# Patient Record
Sex: FEMALE | Race: WHITE | Employment: OTHER | URBAN - METROPOLITAN AREA
[De-identification: names, ages, dates, MRNs, and addresses within clinical notes are randomized per-mention and may not be internally consistent; named-entity substitution may affect disease eponyms.]

---

## 2020-06-25 ENCOUNTER — MEDICAL CORRESPONDENCE (OUTPATIENT)
Dept: HEALTH INFORMATION MANAGEMENT | Facility: CLINIC | Age: 65
End: 2020-06-25

## 2020-09-28 ENCOUNTER — MEDICAL CORRESPONDENCE (OUTPATIENT)
Dept: HEALTH INFORMATION MANAGEMENT | Facility: CLINIC | Age: 65
End: 2020-09-28

## 2020-11-04 ENCOUNTER — HOSPITAL ENCOUNTER (INPATIENT)
Facility: CLINIC | Age: 65
LOS: 6 days | Discharge: NURSING, REHAB OR LONG-TERM CARE | DRG: 871 | End: 2020-11-10
Attending: EMERGENCY MEDICINE | Admitting: INTERNAL MEDICINE
Payer: MEDICARE

## 2020-11-04 ENCOUNTER — APPOINTMENT (OUTPATIENT)
Dept: GENERAL RADIOLOGY | Facility: CLINIC | Age: 65
DRG: 871 | End: 2020-11-04
Attending: EMERGENCY MEDICINE
Payer: MEDICARE

## 2020-11-04 DIAGNOSIS — A41.9 SEPSIS, DUE TO UNSPECIFIED ORGANISM, UNSPECIFIED WHETHER ACUTE ORGAN DYSFUNCTION PRESENT (H): ICD-10-CM

## 2020-11-04 DIAGNOSIS — J18.9 PNEUMONIA OF BOTH LUNGS DUE TO INFECTIOUS ORGANISM, UNSPECIFIED PART OF LUNG: ICD-10-CM

## 2020-11-04 DIAGNOSIS — Z86.73 HISTORY OF STROKE: Primary | ICD-10-CM

## 2020-11-04 DIAGNOSIS — Z20.822 PERSON UNDER INVESTIGATION FOR COVID-19: ICD-10-CM

## 2020-11-04 DIAGNOSIS — R09.02 HYPOXIA: ICD-10-CM

## 2020-11-04 PROBLEM — I48.91 ATRIAL FIBRILLATION (H): Status: ACTIVE | Noted: 2020-11-04

## 2020-11-04 PROBLEM — J44.9 CHRONIC OBSTRUCTIVE PULMONARY DISEASE (H): Status: ACTIVE | Noted: 2020-11-04

## 2020-11-04 LAB
ABO + RH BLD: NORMAL
ABO + RH BLD: NORMAL
ALBUMIN SERPL-MCNC: 2.1 G/DL (ref 3.4–5)
ALBUMIN SERPL-MCNC: 2.4 G/DL (ref 3.4–5)
ALP SERPL-CCNC: 116 U/L (ref 40–150)
ALP SERPL-CCNC: 117 U/L (ref 40–150)
ALT SERPL W P-5'-P-CCNC: 35 U/L (ref 0–50)
ALT SERPL W P-5'-P-CCNC: 37 U/L (ref 0–50)
ANION GAP SERPL CALCULATED.3IONS-SCNC: 4 MMOL/L (ref 3–14)
ANION GAP SERPL CALCULATED.3IONS-SCNC: 5 MMOL/L (ref 3–14)
APTT PPP: 33 SEC (ref 22–37)
AST SERPL W P-5'-P-CCNC: 16 U/L (ref 0–45)
AST SERPL W P-5'-P-CCNC: 17 U/L (ref 0–45)
BASE EXCESS BLDV CALC-SCNC: 10.6 MMOL/L
BASOPHILS # BLD AUTO: 0 10E9/L (ref 0–0.2)
BASOPHILS # BLD AUTO: 0 10E9/L (ref 0–0.2)
BASOPHILS NFR BLD AUTO: 0.1 %
BASOPHILS NFR BLD AUTO: 0.2 %
BILIRUB SERPL-MCNC: 0.2 MG/DL (ref 0.2–1.3)
BILIRUB SERPL-MCNC: 0.3 MG/DL (ref 0.2–1.3)
BUN SERPL-MCNC: 17 MG/DL (ref 7–30)
BUN SERPL-MCNC: 21 MG/DL (ref 7–30)
CALCIUM SERPL-MCNC: 9.2 MG/DL (ref 8.5–10.1)
CALCIUM SERPL-MCNC: 9.3 MG/DL (ref 8.5–10.1)
CHLORIDE SERPL-SCNC: 100 MMOL/L (ref 94–109)
CHLORIDE SERPL-SCNC: 103 MMOL/L (ref 94–109)
CK SERPL-CCNC: 31 U/L (ref 30–225)
CO2 SERPL-SCNC: 31 MMOL/L (ref 20–32)
CO2 SERPL-SCNC: 33 MMOL/L (ref 20–32)
CREAT SERPL-MCNC: 0.37 MG/DL (ref 0.52–1.04)
CREAT SERPL-MCNC: 0.53 MG/DL (ref 0.52–1.04)
CRP SERPL-MCNC: 111 MG/L (ref 0–8)
CRP SERPL-MCNC: 87.3 MG/L (ref 0–8)
D DIMER PPP FEU-MCNC: 2.4 UG/ML FEU (ref 0–0.5)
DIFFERENTIAL METHOD BLD: ABNORMAL
DIFFERENTIAL METHOD BLD: ABNORMAL
EOSINOPHIL # BLD AUTO: 0 10E9/L (ref 0–0.7)
EOSINOPHIL # BLD AUTO: 0.4 10E9/L (ref 0–0.7)
EOSINOPHIL NFR BLD AUTO: 0.1 %
EOSINOPHIL NFR BLD AUTO: 2.5 %
ERYTHROCYTE [DISTWIDTH] IN BLOOD BY AUTOMATED COUNT: 13.7 % (ref 10–15)
ERYTHROCYTE [DISTWIDTH] IN BLOOD BY AUTOMATED COUNT: 13.7 % (ref 10–15)
FERRITIN SERPL-MCNC: 415 NG/ML (ref 8–252)
FIBRINOGEN PPP-MCNC: 766 MG/DL (ref 200–420)
GFR SERPL CREATININE-BSD FRML MDRD: >90 ML/MIN/{1.73_M2}
GFR SERPL CREATININE-BSD FRML MDRD: >90 ML/MIN/{1.73_M2}
GLUCOSE SERPL-MCNC: 128 MG/DL (ref 70–99)
GLUCOSE SERPL-MCNC: 145 MG/DL (ref 70–99)
HCO3 BLDV-SCNC: 36 MMOL/L (ref 21–28)
HCT VFR BLD AUTO: 31.2 % (ref 35–47)
HCT VFR BLD AUTO: 33.3 % (ref 35–47)
HGB BLD-MCNC: 10.4 G/DL (ref 11.7–15.7)
HGB BLD-MCNC: 9.9 G/DL (ref 11.7–15.7)
IMM GRANULOCYTES # BLD: 0.1 10E9/L (ref 0–0.4)
IMM GRANULOCYTES # BLD: 0.1 10E9/L (ref 0–0.4)
IMM GRANULOCYTES NFR BLD: 0.4 %
IMM GRANULOCYTES NFR BLD: 0.5 %
INR PPP: 1.2 (ref 0.86–1.14)
LABORATORY COMMENT REPORT: NORMAL
LACTATE BLD-SCNC: 1.3 MMOL/L (ref 0.7–2)
LDH SERPL L TO P-CCNC: 209 U/L (ref 81–234)
LYMPHOCYTES # BLD AUTO: 0.8 10E9/L (ref 0.8–5.3)
LYMPHOCYTES # BLD AUTO: 1.1 10E9/L (ref 0.8–5.3)
LYMPHOCYTES NFR BLD AUTO: 4.6 %
LYMPHOCYTES NFR BLD AUTO: 7.4 %
MAGNESIUM SERPL-MCNC: 1.7 MG/DL (ref 1.6–2.3)
MCH RBC QN AUTO: 29 PG (ref 26.5–33)
MCH RBC QN AUTO: 29.6 PG (ref 26.5–33)
MCHC RBC AUTO-ENTMCNC: 31.2 G/DL (ref 31.5–36.5)
MCHC RBC AUTO-ENTMCNC: 31.7 G/DL (ref 31.5–36.5)
MCV RBC AUTO: 93 FL (ref 78–100)
MCV RBC AUTO: 93 FL (ref 78–100)
MONOCYTES # BLD AUTO: 0.2 10E9/L (ref 0–1.3)
MONOCYTES # BLD AUTO: 0.8 10E9/L (ref 0–1.3)
MONOCYTES NFR BLD AUTO: 1.3 %
MONOCYTES NFR BLD AUTO: 5.8 %
NEUTROPHILS # BLD AUTO: 12.1 10E9/L (ref 1.6–8.3)
NEUTROPHILS # BLD AUTO: 15.9 10E9/L (ref 1.6–8.3)
NEUTROPHILS NFR BLD AUTO: 83.6 %
NEUTROPHILS NFR BLD AUTO: 93.5 %
NRBC # BLD AUTO: 0 10*3/UL
NRBC # BLD AUTO: 0 10*3/UL
NRBC BLD AUTO-RTO: 0 /100
NRBC BLD AUTO-RTO: 0 /100
NT-PROBNP SERPL-MCNC: 126 PG/ML (ref 0–900)
O2/TOTAL GAS SETTING VFR VENT: ABNORMAL %
PCO2 BLDV: 51 MM HG (ref 40–50)
PH BLDV: 7.46 PH (ref 7.32–7.43)
PLATELET # BLD AUTO: 435 10E9/L (ref 150–450)
PLATELET # BLD AUTO: 441 10E9/L (ref 150–450)
PO2 BLDV: 41 MM HG (ref 25–47)
POTASSIUM SERPL-SCNC: 3.5 MMOL/L (ref 3.4–5.3)
POTASSIUM SERPL-SCNC: 4.2 MMOL/L (ref 3.4–5.3)
PROCALCITONIN SERPL-MCNC: 0.11 NG/ML
PROT SERPL-MCNC: 6.9 G/DL (ref 6.8–8.8)
PROT SERPL-MCNC: 7 G/DL (ref 6.8–8.8)
RBC # BLD AUTO: 3.34 10E12/L (ref 3.8–5.2)
RBC # BLD AUTO: 3.59 10E12/L (ref 3.8–5.2)
RETICS # AUTO: 49.1 10E9/L (ref 25–95)
RETICS/RBC NFR AUTO: 1.5 % (ref 0.5–2)
SARS-COV-2 RNA SPEC QL NAA+PROBE: NEGATIVE
SARS-COV-2 RNA SPEC QL NAA+PROBE: NORMAL
SODIUM SERPL-SCNC: 137 MMOL/L (ref 133–144)
SODIUM SERPL-SCNC: 139 MMOL/L (ref 133–144)
SPECIMEN EXP DATE BLD: NORMAL
SPECIMEN SOURCE: NORMAL
SPECIMEN SOURCE: NORMAL
T4 FREE SERPL-MCNC: 1.49 NG/DL (ref 0.76–1.46)
TROPONIN I SERPL-MCNC: <0.015 UG/L (ref 0–0.04)
TROPONIN I SERPL-MCNC: <0.015 UG/L (ref 0–0.04)
TSH SERPL DL<=0.005 MIU/L-ACNC: 0.15 MU/L (ref 0.4–4)
WBC # BLD AUTO: 14.5 10E9/L (ref 4–11)
WBC # BLD AUTO: 17 10E9/L (ref 4–11)

## 2020-11-04 PROCEDURE — U0003 INFECTIOUS AGENT DETECTION BY NUCLEIC ACID (DNA OR RNA); SEVERE ACUTE RESPIRATORY SYNDROME CORONAVIRUS 2 (SARS-COV-2) (CORONAVIRUS DISEASE [COVID-19]), AMPLIFIED PROBE TECHNIQUE, MAKING USE OF HIGH THROUGHPUT TECHNOLOGIES AS DESCRIBED BY CMS-2020-01-R: HCPCS | Performed by: EMERGENCY MEDICINE

## 2020-11-04 PROCEDURE — 99207 PR CDG-HISTORY COMP: MEETS EXP. PROB FOCUSED- DOWN CODED LACK OF PFSH: CPT | Performed by: INTERNAL MEDICINE

## 2020-11-04 PROCEDURE — 36415 COLL VENOUS BLD VENIPUNCTURE: CPT | Performed by: INTERNAL MEDICINE

## 2020-11-04 PROCEDURE — 83520 IMMUNOASSAY QUANT NOS NONAB: CPT | Performed by: INTERNAL MEDICINE

## 2020-11-04 PROCEDURE — 85045 AUTOMATED RETICULOCYTE COUNT: CPT | Performed by: INTERNAL MEDICINE

## 2020-11-04 PROCEDURE — 83605 ASSAY OF LACTIC ACID: CPT | Performed by: EMERGENCY MEDICINE

## 2020-11-04 PROCEDURE — 999N000157 HC STATISTIC RCP TIME EA 10 MIN

## 2020-11-04 PROCEDURE — 82550 ASSAY OF CK (CPK): CPT | Performed by: INTERNAL MEDICINE

## 2020-11-04 PROCEDURE — 250N000013 HC RX MED GY IP 250 OP 250 PS 637: Performed by: INTERNAL MEDICINE

## 2020-11-04 PROCEDURE — 99285 EMERGENCY DEPT VISIT HI MDM: CPT | Mod: 25

## 2020-11-04 PROCEDURE — 85025 COMPLETE CBC W/AUTO DIFF WBC: CPT | Performed by: EMERGENCY MEDICINE

## 2020-11-04 PROCEDURE — 83735 ASSAY OF MAGNESIUM: CPT | Performed by: EMERGENCY MEDICINE

## 2020-11-04 PROCEDURE — 93005 ELECTROCARDIOGRAM TRACING: CPT

## 2020-11-04 PROCEDURE — 86140 C-REACTIVE PROTEIN: CPT | Performed by: EMERGENCY MEDICINE

## 2020-11-04 PROCEDURE — 96361 HYDRATE IV INFUSION ADD-ON: CPT

## 2020-11-04 PROCEDURE — 272N000054 HC CANNULA HIGH FLOW, ADULT

## 2020-11-04 PROCEDURE — 83880 ASSAY OF NATRIURETIC PEPTIDE: CPT | Performed by: EMERGENCY MEDICINE

## 2020-11-04 PROCEDURE — 84439 ASSAY OF FREE THYROXINE: CPT | Performed by: INTERNAL MEDICINE

## 2020-11-04 PROCEDURE — A9270 NON-COVERED ITEM OR SERVICE: HCPCS | Performed by: INTERNAL MEDICINE

## 2020-11-04 PROCEDURE — 96375 TX/PRO/DX INJ NEW DRUG ADDON: CPT

## 2020-11-04 PROCEDURE — 80053 COMPREHEN METABOLIC PANEL: CPT | Performed by: EMERGENCY MEDICINE

## 2020-11-04 PROCEDURE — 85025 COMPLETE CBC W/AUTO DIFF WBC: CPT | Performed by: INTERNAL MEDICINE

## 2020-11-04 PROCEDURE — C9803 HOPD COVID-19 SPEC COLLECT: HCPCS

## 2020-11-04 PROCEDURE — 99221 1ST HOSP IP/OBS SF/LOW 40: CPT | Mod: AI | Performed by: INTERNAL MEDICINE

## 2020-11-04 PROCEDURE — 120N000004 HC R&B MS OVERFLOW

## 2020-11-04 PROCEDURE — 85730 THROMBOPLASTIN TIME PARTIAL: CPT | Performed by: INTERNAL MEDICINE

## 2020-11-04 PROCEDURE — 87040 BLOOD CULTURE FOR BACTERIA: CPT | Performed by: EMERGENCY MEDICINE

## 2020-11-04 PROCEDURE — 258N000003 HC RX IP 258 OP 636: Performed by: EMERGENCY MEDICINE

## 2020-11-04 PROCEDURE — 84443 ASSAY THYROID STIM HORMONE: CPT | Performed by: INTERNAL MEDICINE

## 2020-11-04 PROCEDURE — 999N000215 HC STATISTIC HFNC ADULT NON-CPAP

## 2020-11-04 PROCEDURE — 84484 ASSAY OF TROPONIN QUANT: CPT | Performed by: INTERNAL MEDICINE

## 2020-11-04 PROCEDURE — 85610 PROTHROMBIN TIME: CPT | Performed by: INTERNAL MEDICINE

## 2020-11-04 PROCEDURE — 84484 ASSAY OF TROPONIN QUANT: CPT | Performed by: EMERGENCY MEDICINE

## 2020-11-04 PROCEDURE — 258N000003 HC RX IP 258 OP 636: Performed by: INTERNAL MEDICINE

## 2020-11-04 PROCEDURE — 96365 THER/PROPH/DIAG IV INF INIT: CPT

## 2020-11-04 PROCEDURE — 86140 C-REACTIVE PROTEIN: CPT | Performed by: INTERNAL MEDICINE

## 2020-11-04 PROCEDURE — 86900 BLOOD TYPING SEROLOGIC ABO: CPT | Performed by: INTERNAL MEDICINE

## 2020-11-04 PROCEDURE — 85300 ANTITHROMBIN III ACTIVITY: CPT | Performed by: INTERNAL MEDICINE

## 2020-11-04 PROCEDURE — 84145 PROCALCITONIN (PCT): CPT | Performed by: INTERNAL MEDICINE

## 2020-11-04 PROCEDURE — 71045 X-RAY EXAM CHEST 1 VIEW: CPT

## 2020-11-04 PROCEDURE — 82728 ASSAY OF FERRITIN: CPT | Performed by: INTERNAL MEDICINE

## 2020-11-04 PROCEDURE — 83615 LACTATE (LD) (LDH) ENZYME: CPT | Performed by: INTERNAL MEDICINE

## 2020-11-04 PROCEDURE — 80053 COMPREHEN METABOLIC PANEL: CPT | Performed by: INTERNAL MEDICINE

## 2020-11-04 PROCEDURE — 85384 FIBRINOGEN ACTIVITY: CPT | Performed by: INTERNAL MEDICINE

## 2020-11-04 PROCEDURE — 85379 FIBRIN DEGRADATION QUANT: CPT | Performed by: INTERNAL MEDICINE

## 2020-11-04 PROCEDURE — 250N000011 HC RX IP 250 OP 636: Performed by: EMERGENCY MEDICINE

## 2020-11-04 PROCEDURE — 82803 BLOOD GASES ANY COMBINATION: CPT | Performed by: EMERGENCY MEDICINE

## 2020-11-04 PROCEDURE — 86901 BLOOD TYPING SEROLOGIC RH(D): CPT | Performed by: INTERNAL MEDICINE

## 2020-11-04 RX ORDER — PANTOPRAZOLE SODIUM 40 MG/1
40 TABLET, DELAYED RELEASE ORAL DAILY
Status: DISCONTINUED | OUTPATIENT
Start: 2020-11-05 | End: 2020-11-10 | Stop reason: HOSPADM

## 2020-11-04 RX ORDER — CALCIUM CARBONATE 500 MG/1
1 TABLET, CHEWABLE ORAL 3 TIMES DAILY PRN
COMMUNITY

## 2020-11-04 RX ORDER — ATORVASTATIN CALCIUM 40 MG/1
40 TABLET, FILM COATED ORAL AT BEDTIME
COMMUNITY

## 2020-11-04 RX ORDER — AMOXICILLIN 250 MG
1 CAPSULE ORAL 2 TIMES DAILY
Status: DISCONTINUED | OUTPATIENT
Start: 2020-11-04 | End: 2020-11-10 | Stop reason: HOSPADM

## 2020-11-04 RX ORDER — HYDROCODONE BITARTRATE AND ACETAMINOPHEN 5; 325 MG/1; MG/1
1 TABLET ORAL EVERY 4 HOURS PRN
Status: DISCONTINUED | OUTPATIENT
Start: 2020-11-04 | End: 2020-11-10 | Stop reason: HOSPADM

## 2020-11-04 RX ORDER — FUROSEMIDE 20 MG
20 TABLET ORAL EVERY MORNING
COMMUNITY

## 2020-11-04 RX ORDER — DEXAMETHASONE SODIUM PHOSPHATE 4 MG/ML
6 INJECTION, SOLUTION INTRA-ARTICULAR; INTRALESIONAL; INTRAMUSCULAR; INTRAVENOUS; SOFT TISSUE DAILY
Status: DISCONTINUED | OUTPATIENT
Start: 2020-11-05 | End: 2020-11-06

## 2020-11-04 RX ORDER — DULOXETIN HYDROCHLORIDE 20 MG/1
40 CAPSULE, DELAYED RELEASE ORAL EVERY EVENING
COMMUNITY

## 2020-11-04 RX ORDER — MONTELUKAST SODIUM 10 MG/1
10 TABLET ORAL AT BEDTIME
COMMUNITY

## 2020-11-04 RX ORDER — LIDOCAINE 40 MG/G
CREAM TOPICAL
Status: DISCONTINUED | OUTPATIENT
Start: 2020-11-04 | End: 2020-11-10 | Stop reason: HOSPADM

## 2020-11-04 RX ORDER — NALOXONE HYDROCHLORIDE 0.4 MG/ML
.1-.4 INJECTION, SOLUTION INTRAMUSCULAR; INTRAVENOUS; SUBCUTANEOUS
Status: DISCONTINUED | OUTPATIENT
Start: 2020-11-04 | End: 2020-11-10 | Stop reason: HOSPADM

## 2020-11-04 RX ORDER — GABAPENTIN 300 MG/1
900 CAPSULE ORAL AT BEDTIME
COMMUNITY

## 2020-11-04 RX ORDER — CEFTRIAXONE 2 G/1
2 INJECTION, POWDER, FOR SOLUTION INTRAMUSCULAR; INTRAVENOUS ONCE
Status: COMPLETED | OUTPATIENT
Start: 2020-11-04 | End: 2020-11-04

## 2020-11-04 RX ORDER — HYDROCODONE BITARTRATE AND ACETAMINOPHEN 5; 325 MG/1; MG/1
1 TABLET ORAL 2 TIMES DAILY
Status: ON HOLD | COMMUNITY
End: 2020-11-10

## 2020-11-04 RX ORDER — MULTIVITAMIN,THERAPEUTIC
1 TABLET ORAL DAILY
COMMUNITY

## 2020-11-04 RX ORDER — PANTOPRAZOLE SODIUM 40 MG/1
40 TABLET, DELAYED RELEASE ORAL DAILY
COMMUNITY

## 2020-11-04 RX ORDER — LIDOCAINE HYDROCHLORIDE 40 MG/ML
SOLUTION TOPICAL DAILY PRN
Status: ON HOLD | COMMUNITY
End: 2020-11-10

## 2020-11-04 RX ORDER — AMOXICILLIN 250 MG
2 CAPSULE ORAL 2 TIMES DAILY
Status: DISCONTINUED | OUTPATIENT
Start: 2020-11-04 | End: 2020-11-10 | Stop reason: HOSPADM

## 2020-11-04 RX ORDER — BISACODYL 10 MG
10 SUPPOSITORY, RECTAL RECTAL DAILY PRN
COMMUNITY

## 2020-11-04 RX ORDER — TIZANIDINE 2 MG/1
6 TABLET ORAL 3 TIMES DAILY
Status: DISCONTINUED | OUTPATIENT
Start: 2020-11-04 | End: 2020-11-10 | Stop reason: HOSPADM

## 2020-11-04 RX ORDER — SENNOSIDES 8.6 MG
2 TABLET ORAL 2 TIMES DAILY PRN
COMMUNITY

## 2020-11-04 RX ORDER — AZITHROMYCIN 500 MG/1
500 INJECTION, POWDER, LYOPHILIZED, FOR SOLUTION INTRAVENOUS ONCE
Status: COMPLETED | OUTPATIENT
Start: 2020-11-04 | End: 2020-11-04

## 2020-11-04 RX ORDER — TIZANIDINE HYDROCHLORIDE 6 MG/1
6 CAPSULE, GELATIN COATED ORAL 3 TIMES DAILY
COMMUNITY

## 2020-11-04 RX ORDER — ACETAMINOPHEN 325 MG/1
650 TABLET ORAL EVERY 4 HOURS PRN
Status: DISCONTINUED | OUTPATIENT
Start: 2020-11-04 | End: 2020-11-10 | Stop reason: HOSPADM

## 2020-11-04 RX ORDER — HYDROCODONE BITARTRATE AND ACETAMINOPHEN 5; 325 MG/1; MG/1
1 TABLET ORAL 2 TIMES DAILY PRN
Status: ON HOLD | COMMUNITY
End: 2020-11-10

## 2020-11-04 RX ORDER — SENNOSIDES 8.6 MG
2 TABLET ORAL 2 TIMES DAILY PRN
Status: DISCONTINUED | OUTPATIENT
Start: 2020-11-04 | End: 2020-11-10 | Stop reason: HOSPADM

## 2020-11-04 RX ORDER — AZITHROMYCIN 250 MG/1
250 TABLET, FILM COATED ORAL DAILY
Status: COMPLETED | OUTPATIENT
Start: 2020-11-05 | End: 2020-11-08

## 2020-11-04 RX ORDER — FUROSEMIDE 20 MG
20 TABLET ORAL EVERY MORNING
Status: DISCONTINUED | OUTPATIENT
Start: 2020-11-05 | End: 2020-11-10 | Stop reason: HOSPADM

## 2020-11-04 RX ORDER — GABAPENTIN 600 MG/1
600 TABLET ORAL
Status: DISCONTINUED | OUTPATIENT
Start: 2020-11-05 | End: 2020-11-10 | Stop reason: HOSPADM

## 2020-11-04 RX ORDER — ACETAMINOPHEN 650 MG/1
650 SUPPOSITORY RECTAL EVERY 4 HOURS PRN
Status: DISCONTINUED | OUTPATIENT
Start: 2020-11-04 | End: 2020-11-10 | Stop reason: HOSPADM

## 2020-11-04 RX ORDER — CEFTRIAXONE 1 G/1
1 INJECTION, POWDER, FOR SOLUTION INTRAMUSCULAR; INTRAVENOUS EVERY 24 HOURS
Status: DISCONTINUED | OUTPATIENT
Start: 2020-11-05 | End: 2020-11-06

## 2020-11-04 RX ORDER — ALBUTEROL SULFATE 90 UG/1
2 AEROSOL, METERED RESPIRATORY (INHALATION) EVERY 4 HOURS PRN
Status: DISCONTINUED | OUTPATIENT
Start: 2020-11-04 | End: 2020-11-10 | Stop reason: HOSPADM

## 2020-11-04 RX ORDER — MONTELUKAST SODIUM 10 MG/1
10 TABLET ORAL AT BEDTIME
Status: DISCONTINUED | OUTPATIENT
Start: 2020-11-04 | End: 2020-11-10 | Stop reason: HOSPADM

## 2020-11-04 RX ORDER — GABAPENTIN 600 MG/1
600 TABLET ORAL
COMMUNITY

## 2020-11-04 RX ORDER — DULOXETIN HYDROCHLORIDE 20 MG/1
40 CAPSULE, DELAYED RELEASE ORAL EVERY EVENING
Status: DISCONTINUED | OUTPATIENT
Start: 2020-11-04 | End: 2020-11-10 | Stop reason: HOSPADM

## 2020-11-04 RX ORDER — GABAPENTIN 300 MG/1
900 CAPSULE ORAL AT BEDTIME
Status: DISCONTINUED | OUTPATIENT
Start: 2020-11-04 | End: 2020-11-10 | Stop reason: HOSPADM

## 2020-11-04 RX ORDER — ALBUTEROL SULFATE 90 UG/1
2 AEROSOL, METERED RESPIRATORY (INHALATION) EVERY 4 HOURS PRN
COMMUNITY

## 2020-11-04 RX ORDER — AMOXICILLIN 250 MG
2 CAPSULE ORAL 2 TIMES DAILY
Status: DISCONTINUED | OUTPATIENT
Start: 2020-11-04 | End: 2020-11-04

## 2020-11-04 RX ORDER — ONDANSETRON 2 MG/ML
4 INJECTION INTRAMUSCULAR; INTRAVENOUS EVERY 6 HOURS PRN
Status: DISCONTINUED | OUTPATIENT
Start: 2020-11-04 | End: 2020-11-10 | Stop reason: HOSPADM

## 2020-11-04 RX ORDER — BISACODYL 10 MG
10 SUPPOSITORY, RECTAL RECTAL DAILY PRN
Status: DISCONTINUED | OUTPATIENT
Start: 2020-11-04 | End: 2020-11-10 | Stop reason: HOSPADM

## 2020-11-04 RX ORDER — QUETIAPINE FUMARATE 25 MG/1
25 TABLET, FILM COATED ORAL 3 TIMES DAILY
COMMUNITY

## 2020-11-04 RX ORDER — AMOXICILLIN 250 MG
2 CAPSULE ORAL 2 TIMES DAILY
COMMUNITY

## 2020-11-04 RX ORDER — ONDANSETRON 4 MG/1
4 TABLET, ORALLY DISINTEGRATING ORAL EVERY 6 HOURS PRN
Status: DISCONTINUED | OUTPATIENT
Start: 2020-11-04 | End: 2020-11-10 | Stop reason: HOSPADM

## 2020-11-04 RX ORDER — POLYETHYLENE GLYCOL 3350 17 G/17G
17 POWDER, FOR SOLUTION ORAL DAILY
Status: DISCONTINUED | OUTPATIENT
Start: 2020-11-04 | End: 2020-11-10 | Stop reason: HOSPADM

## 2020-11-04 RX ORDER — SODIUM CHLORIDE, SODIUM LACTATE, POTASSIUM CHLORIDE, CALCIUM CHLORIDE 600; 310; 30; 20 MG/100ML; MG/100ML; MG/100ML; MG/100ML
INJECTION, SOLUTION INTRAVENOUS CONTINUOUS
Status: DISCONTINUED | OUTPATIENT
Start: 2020-11-04 | End: 2020-11-04

## 2020-11-04 RX ORDER — DEXAMETHASONE SODIUM PHOSPHATE 10 MG/ML
10 INJECTION, SOLUTION INTRAMUSCULAR; INTRAVENOUS ONCE
Status: COMPLETED | OUTPATIENT
Start: 2020-11-04 | End: 2020-11-04

## 2020-11-04 RX ORDER — NYSTATIN 100000 [USP'U]/G
POWDER TOPICAL 2 TIMES DAILY PRN
COMMUNITY

## 2020-11-04 RX ORDER — ATORVASTATIN CALCIUM 40 MG/1
40 TABLET, FILM COATED ORAL AT BEDTIME
Status: DISCONTINUED | OUTPATIENT
Start: 2020-11-04 | End: 2020-11-10 | Stop reason: HOSPADM

## 2020-11-04 RX ORDER — LANOLIN ALCOHOL/MO/W.PET/CERES
1 CREAM (GRAM) TOPICAL AT BEDTIME
COMMUNITY

## 2020-11-04 RX ORDER — ALBUTEROL SULFATE 90 UG/1
2 AEROSOL, METERED RESPIRATORY (INHALATION) 4 TIMES DAILY
Status: DISCONTINUED | OUTPATIENT
Start: 2020-11-04 | End: 2020-11-10 | Stop reason: HOSPADM

## 2020-11-04 RX ORDER — SODIUM CHLORIDE, SODIUM LACTATE, POTASSIUM CHLORIDE, CALCIUM CHLORIDE 600; 310; 30; 20 MG/100ML; MG/100ML; MG/100ML; MG/100ML
INJECTION, SOLUTION INTRAVENOUS CONTINUOUS
Status: DISCONTINUED | OUTPATIENT
Start: 2020-11-04 | End: 2020-11-09

## 2020-11-04 RX ADMIN — DOCUSATE SODIUM 50 MG AND SENNOSIDES 8.6 MG 1 TABLET: 8.6; 5 TABLET, FILM COATED ORAL at 21:29

## 2020-11-04 RX ADMIN — ATORVASTATIN CALCIUM 40 MG: 40 TABLET, FILM COATED ORAL at 21:29

## 2020-11-04 RX ADMIN — GABAPENTIN 900 MG: 300 CAPSULE ORAL at 21:28

## 2020-11-04 RX ADMIN — DICLOFENAC SODIUM 2 G: 10 GEL TOPICAL at 21:30

## 2020-11-04 RX ADMIN — CEFTRIAXONE SODIUM 2 G: 2 INJECTION, POWDER, FOR SOLUTION INTRAMUSCULAR; INTRAVENOUS at 05:40

## 2020-11-04 RX ADMIN — HYDROCODONE BITARTRATE AND ACETAMINOPHEN 1 TABLET: 5; 325 TABLET ORAL at 19:02

## 2020-11-04 RX ADMIN — QUETIAPINE 25 MG: 25 TABLET ORAL at 21:29

## 2020-11-04 RX ADMIN — SODIUM CHLORIDE, POTASSIUM CHLORIDE, SODIUM LACTATE AND CALCIUM CHLORIDE: 600; 310; 30; 20 INJECTION, SOLUTION INTRAVENOUS at 04:47

## 2020-11-04 RX ADMIN — ALBUTEROL SULFATE 2 PUFF: 90 AEROSOL, METERED RESPIRATORY (INHALATION) at 21:36

## 2020-11-04 RX ADMIN — MONTELUKAST 10 MG: 10 TABLET, FILM COATED ORAL at 21:29

## 2020-11-04 RX ADMIN — APIXABAN 5 MG: 5 TABLET, FILM COATED ORAL at 21:29

## 2020-11-04 RX ADMIN — ALBUTEROL SULFATE 2 PUFF: 90 AEROSOL, METERED RESPIRATORY (INHALATION) at 16:06

## 2020-11-04 RX ADMIN — SODIUM CHLORIDE, POTASSIUM CHLORIDE, SODIUM LACTATE AND CALCIUM CHLORIDE: 600; 310; 30; 20 INJECTION, SOLUTION INTRAVENOUS at 23:43

## 2020-11-04 RX ADMIN — TIZANIDINE 6 MG: 2 TABLET ORAL at 21:29

## 2020-11-04 RX ADMIN — FLUTICASONE FUROATE AND VILANTEROL TRIFENATATE 1 PUFF: 200; 25 POWDER RESPIRATORY (INHALATION) at 21:30

## 2020-11-04 RX ADMIN — DULOXETINE HYDROCHLORIDE 40 MG: 20 CAPSULE, DELAYED RELEASE ORAL at 21:29

## 2020-11-04 RX ADMIN — DEXAMETHASONE SODIUM PHOSPHATE 10 MG: 10 INJECTION, SOLUTION INTRAMUSCULAR; INTRAVENOUS at 05:39

## 2020-11-04 RX ADMIN — AZITHROMYCIN MONOHYDRATE 500 MG: 500 INJECTION, POWDER, LYOPHILIZED, FOR SOLUTION INTRAVENOUS at 06:23

## 2020-11-04 RX ADMIN — SODIUM CHLORIDE, POTASSIUM CHLORIDE, SODIUM LACTATE AND CALCIUM CHLORIDE: 600; 310; 30; 20 INJECTION, SOLUTION INTRAVENOUS at 14:10

## 2020-11-04 RX ADMIN — HYDROCODONE BITARTRATE AND ACETAMINOPHEN 1 TABLET: 5; 325 TABLET ORAL at 23:43

## 2020-11-04 ASSESSMENT — ENCOUNTER SYMPTOMS
SHORTNESS OF BREATH: 1
SORE THROAT: 0
HEADACHES: 0
DIARRHEA: 0
ABDOMINAL PAIN: 0

## 2020-11-04 ASSESSMENT — ACTIVITIES OF DAILY LIVING (ADL)
ADLS_ACUITY_SCORE: 14
ADLS_ACUITY_SCORE: 19

## 2020-11-04 NOTE — ED NOTES
"Adult diaper changed after urine void. PureWick placed at this time (-40 continuous suction). Pt turned on L side, which she states is \"my preferred side\". Will continue to monitor.   "

## 2020-11-04 NOTE — PROGRESS NOTES
Pt placed on 45L 100% HFNC per order. sats remain in the high 90's. No apparent respiratory distress.

## 2020-11-04 NOTE — PHARMACY-ADMISSION MEDICATION HISTORY
Pharmacy Medication History  Admission medication history interview status for the 11/4/2020  admission is complete. See EPIC admission navigator for prior to admission medications       Medication history sources: MAR ([facility])  Location of interview: {Phone,  Medication history source reliability: Good  Adherence assessment: Good    Significant changes made to the medication list:        Additional medication history information:   I called facility 5x for them to send a MAR; list is as best as able     Medication reconciliation completed by provider prior to medication history? No    Time spent in this activity: 60 min      Prior to Admission medications    Medication Sig Last Dose Taking? Auth Provider   albuterol (PROAIR HFA/PROVENTIL HFA/VENTOLIN HFA) 108 (90 Base) MCG/ACT inhaler Inhale 2 puffs into the lungs every 4 hours as needed for shortness of breath / dyspnea or wheezing  Yes Unknown, Entered By History   apixaban ANTICOAGULANT (ELIQUIS) 5 MG tablet Take 5 mg by mouth 2 times daily 11/3/2020 at Unknown time Yes Unknown, Entered By History   atorvastatin (LIPITOR) 40 MG tablet Take 40 mg by mouth At Bedtime 11/3/2020 at Unknown time Yes Unknown, Entered By History   bisacodyl (DULCOLAX) 10 MG suppository Place 10 mg rectally daily as needed for constipation Past Month at Unknown time Yes Unknown, Entered By History   calcium carbonate (TUMS) 500 MG chewable tablet Take 1 chew tab by mouth 3 times daily as needed for heartburn Past Month at Unknown time Yes Unknown, Entered By History   diclofenac (VOLTAREN) 1 % topical gel Apply 2 g topically 3 times daily To left hand 11/3/2020 at Unknown time Yes Unknown, Entered By History   DULoxetine (CYMBALTA) 20 MG capsule Take 40 mg by mouth every evening 11/3/2020 at Unknown time Yes Unknown, Entered By History   fluticasone-salmeterol (ADVAIR) 500-50 MCG/DOSE inhaler Inhale 1 puff into the lungs 2 times daily 11/3/2020 at Unknown time Yes Unknown,  Entered By History   furosemide (LASIX) 20 MG tablet Take 20 mg by mouth every morning 11/3/2020 at Unknown time Yes Unknown, Entered By History   gabapentin (NEURONTIN) 300 MG capsule Take 900 mg by mouth At Bedtime 11/3/2020 at Unknown time Yes Unknown, Entered By History   gabapentin (NEURONTIN) 600 MG tablet Take 600 mg by mouth 2 times daily 11/3/2020 at Unknown time Yes Unknown, Entered By History   HYDROcodone-acetaminophen (NORCO) 5-325 MG tablet Take 1 tablet by mouth 2 times daily as needed for severe pain  Yes Unknown, Entered By History   HYDROcodone-acetaminophen (NORCO) 5-325 MG tablet Take 1 tablet by mouth 2 times daily  Yes Unknown, Entered By History   lidocaine (XYLOCAINE) 4 % external solution Apply topically daily as needed for moderate pain  Yes Unknown, Entered By History   melatonin 3 MG tablet Take 1 mg by mouth At Bedtime 11/3/2020 at Unknown time Yes Unknown, Entered By History   montelukast (SINGULAIR) 10 MG tablet Take 10 mg by mouth At Bedtime 11/3/2020 at Unknown time Yes Unknown, Entered By History   multivitamin, therapeutic (THERA-VIT) TABS tablet Take 1 tablet by mouth daily 11/3/2020 at Unknown time Yes Unknown, Entered By History   nystatin (NYSTOP) 600723 UNIT/GM external powder Apply topically 2 times daily as needed for other (yeast)  Yes Unknown, Entered By History   pantoprazole (PROTONIX) 40 MG EC tablet Take 40 mg by mouth daily 11/3/2020 at Unknown time Yes Unknown, Entered By History   QUEtiapine (SEROQUEL) 25 MG tablet Take 25 mg by mouth 3 times daily 11/3/2020 at Unknown time Yes Unknown, Entered By History   senna-docusate (SENOKOT-S/PERICOLACE) 8.6-50 MG tablet Take 2 tablets by mouth 2 times daily 11/3/2020 at Unknown time Yes Unknown, Entered By History   sennosides (SENOKOT) 8.6 MG tablet Take 2 tablets by mouth 2 times daily as needed for constipation  at Unknown time Yes Unknown, Entered By History   tiZANidine (ZANAFLEX) 6 MG capsule Take 6 mg by mouth 3 times  daily 11/3/2020 at Unknown time Yes Unknown, Entered By History

## 2020-11-04 NOTE — ED PROVIDER NOTES
Patient signed out to me by Dr. Frederick.  She resides in a long-term care facility and has been increasingly short of breath over the last couple of weeks.  She came in hypoxic with a cough.  She appears to have pneumonia and likely COVID-19.  She is currently on high flow O2 and has received IV dexamethasone.  At this point she is awaiting an inpatient hospital bed.     Claudy Aviles MD  11/04/20 0819

## 2020-11-04 NOTE — ED NOTES
[facility]  ED Nurse Handoff Report    ED Chief complaint: Shortness of Breath and Cough      ED Diagnosis:   Final diagnoses:   None       Code Status: POLST states DNR. Please review with admitting provider.    Allergies:   Allergies   Allergen Reactions     Msg [Monosodium Glutamate]        Patient Story: Pt present via EMS, from [facility], for low oxygen saturation. Pt arrived on 15L via non-re breather and an oxygen saturation of 88%. Pt denied breathing problems. She reports pain caused by pressure ulcer on her bottom.   Focused Assessment:  Oxygen saturation only increased with use of high flow nasal cannula at 45L. Saturation at 95% at this time. Pt pain free laying on her left side. Left sided weakness present due to previous CVA.    Treatments and/or interventions provided: High flow nasal cannula  Patient's response to treatments and/or interventions: Increased oxygen saturation.    To be done/followed up on inpatient unit:  Antibiotics and supplemental oxygen.    Does this patient have any cognitive concerns?: None noted.    Activity level - Baseline/Home:  Wheelchair  Activity Level - Current:   Total Care    Patient's Preferred language: English   Needed?: No    Isolation: COVID r/o and special precautions  Infection: COVID r/o and special precautions  Patient tested for COVID 19 prior to admission: YES  Bariatric?: No    Vital Signs:   Vitals:    11/04/20 0500 11/04/20 0507 11/04/20 0515 11/04/20 0530   BP: 130/87   118/79   Pulse: 106 108 114 103   Resp: 18 16 11 11   Temp:       TempSrc:       SpO2: 97% 92% 98% 97%   Weight:         Labs Ordered and Resulted from Time of ED Arrival Up to the Time of Departure from the ED   CBC WITH PLATELETS DIFFERENTIAL - Abnormal; Notable for the following components:       Result Value    WBC 14.5 (*)     RBC Count 3.59 (*)     Hemoglobin 10.4 (*)     Hematocrit 33.3 (*)     MCHC 31.2 (*)     Absolute Neutrophil 12.1 (*)      All other components within normal limits   COMPREHENSIVE METABOLIC PANEL - Abnormal; Notable for the following components:    Carbon Dioxide 33 (*)     Glucose 128 (*)     Albumin 2.4 (*)     All other components within normal limits   BLOOD GAS VENOUS - Abnormal; Notable for the following components:    Ph Venous 7.46 (*)     PCO2 Venous 51 (*)     Bicarbonate Venous 36 (*)     All other components within normal limits   CRP INFLAMMATION - Abnormal; Notable for the following components:    CRP Inflammation 87.3 (*)     All other components within normal limits   MAGNESIUM   LACTIC ACID WHOLE BLOOD   TROPONIN I   COVID-19 VIRUS (CORONAVIRUS) BY PCR   NT PROBNP INPATIENT   VITAL SIGNS   PULSE OXIMETRY NURSING   CARDIAC CONTINUOUS MONITORING   PERIPHERAL IV CATHETER   NOTIFY PHYSICIAN   BLOOD CULTURE   BLOOD CULTURE     XR Chest Port 1 View   Final Result   IMPRESSION: Partial obscuration cardiac silhouette. Bilateral interstitial and airspace infiltrates greater in the lung bases. Small left effusion not excluded. Atherosclerotic aorta.        Cardiac Rhythm:Cardiac Rhythm: Sinus tachycardia    Was the PSS-3 completed:   Yes  What interventions are required if any?      Family Comments: None  OBS brochure/video discussed/provided to patient/family: N/A    For the majority of the shift this patient's behavior was Green.   Behavioral interventions performed were None.    ED NURSE PHONE NUMBER: [phone]

## 2020-11-04 NOTE — ED NOTES
Bed: ED02  Expected date: 11/4/20  Expected time: 4:06 AM  Means of arrival: Ambulance  Comments:  Lucy M2 65F Resp. Distress, low sats

## 2020-11-04 NOTE — H&P
Admitted:     11/04/2020      PRIMARY CARE PROVIDER:  Staci Bustos MD      CHIEF COMPLAINT:  Shortness of breath, cough.      HISTORY OF PRESENT ILLNESS:  Montse Cordova is a 65-year-old  female who lives in long-term care facility who is debilitated from a stroke who is nonambulatory who has had positive sick contacts from other residents who have had COVID, presents to [facility] Emergency Department with hypoxia.  The patient over the last week has had a wet cough and has been hypoxemic.  They have been giving her oxygen at her facility even though she is not on oxygen.  The patient has not been on any antibiotics.  The patient herself does not complain of any shortness of breath, no chest pain.  She has had some diarrhea.  No vomiting or nausea.  The patient came to [facility].  It was noted that her oxygen saturations were in the mid-80s, improved to 99% with oxygen.  Initially, she needed up to 12 liters and eventually switched over to high flow and up to 45 liters at 100% oxygen to maintain her oxygen saturations.  COVID was tested.  White count was 14.1, hemoglobin 10.4, platelets 441.  Chest x-ray showed bilateral interstitial and airspace opacities in both lungs.  BMP is normal.  ProBNP is 126.  Troponin is negative.  CRP is elevated at 87.  White count 14.5, platelets of 441, hemoglobin 10.4.  The patient is DNR/DNI.  She was given dexamethasone, IV ceftriaxone and Zithromax.  She was given lactated Ringer's and is being admitted for suspected COVID pneumonia as well as bacterial pneumonia.      PAST MEDICAL HISTORY:   1. History of stroke with residual left-sided deficits.  The patient is nonambulatory.   2. History of AFib, on Eliquis.   3. History of COPD.   4. History of head injury.   5. Dysphagia  6. RYLIE  7. CARMEN, Agorophobia  8. Imbalance  9. Cognitive communication deficit  10. Mod persistent asthma  11. Diastolic dysfunction  12. HTN  13. AAA      SOCIAL  HISTORY:  The patient is .  No tobacco or alcohol.  She lives in long-term care facility.  She is DNR/DNI.      ALLERGIES:  MONOSODIUM GLUTAMATE.      CURRENT MEDICATION LIST:   1. Albuterol MDI  2. apixiban  3. Atorvastatin  4. Tums  5. Dulcolax  6. Duloxetine  7. Fluticasone-salmeterol  8. Furosemide  9. Gabapentin  10. Hydrocodone  11. Lidocaine solution  12. Melatonin  13. Montelukast  14. MVI  15. Pantoprazole  16. quietiapine  17. Senna  18. Tinazidine  19. Voltaren gel     REVIEW OF SYSTEMS:  Ten points reviewed.  Positive for shortness of breath.  No chest pain.  No abdominal pain.  She did have a little bit of diarrhea earlier in her hospital course.  No headache.  No sore throat.  No loss of smell.  Positive sick contacts.      PHYSICAL EXAMINATION:   VITAL SIGNS:  Temperature 99.4, heart rate 118, respirations 25, blood pressure 115/89, sats are 88% on room air.   GENERAL:  The patient is a heavyset 65-year-old  female.  The patient is on high-flow oxygen.  She is oriented x3.   HEENT:  Oropharynx:  Mucous membranes are moist.  Pupils are equal.   NECK:  Veins are difficult to assess.   PULMONARY:  Clear to auscultation.   CARDIOVASCULAR:  S1, S2, regular rate and rhythm.   GASTROINTESTINAL:  Abdomen is obese.  Soft, nontender, normoactive bowel sounds.   MUSCULOSKELETAL:  No edema.   NEUROLOGIC:  She is able to move all 4 extremities.  Cranial nerves grossly intact.  She is oriented x3.   SKIN:  Warm, dry, well perfused.   PSYCHIATRIC:  Mood and affect stable and normal.      LABORATORY DATA AND IMAGING STUDIES:  ECG shows sinus tachycardia, heart rate 118.  Chest x-ray showed bilateral interstitial airspace infiltrates, greater at the lung bases.  CRP elevated.  White count 14.4, hemoglobin 10.4, platelets of 441.  Lactic acid 1.3.  Venous blood gas showed pH of 7.46, pCO2 of 51, bicarbonate of 36.  Troponin is negative.  Blood culture obtained.  Symptomatic PCR.  Full code was  obtained.      ASSESSMENT:  Micah Pinzon, 65, lives in a long-term care facility with prior stroke and nonambulatory with right-sided deficits with positive sick contacts for COVID, being admitted for suspected COVID as well as bacterial pneumonia.      PLAN:   1. Acute hypoxic respiratory failure in the setting of suspected COVID plus/minus bacterial pneumonia.  The patient is DNR/DNI.  The patient will be treated with dexamethasone, Lovenox as well as ceftriaxone and Zithromax.  We will check a procalcitonin level.  We will check a D-dimer level.  The patient is likely a candidate for remdesivir.  If the patient's first COVID is negative, suspicion is still high, so we would retest in 3 days.   2. History of stroke with right-sided deficits.  The patient is normally not ambulatory.  We will obtain evaluation from PT and OT.   3. MDD.  Continue duloxetine, quetiapine, melatonin once verified  4. GERD.  Continue PPI  5. Neuropathy,  Continue neurontin   6. Hx of asthma.  Continue inhalers once verified, no evidence of bronchospam on exam  7. Hyperlipidemia  - continue statin  8. Chronic pain  -- hydrocodone, voltaren gel, gabapentin  9. Decubitus Ulcer in coccyx  -- woc to see  10. Diposition  -- anticipate 3-5 days     CODE STATUS:  DNR/DNI.            ABHI CORDERO MD             D: 2020   T: 2020   MT: INOCENCIO      Name:     MICAH PINZON   MRN:      [id number]        Account:      [id number]   :      1955        Admitted:     2020                   Document: [id number]       cc: Staci Bustos MD

## 2020-11-04 NOTE — ED NOTES
Adult diaper changed at this time, stool only. purewick catheter changed. Repositioned onto R side.

## 2020-11-04 NOTE — ED PROVIDER NOTES
History     Chief Complaint:  Shortness of Breath and Cough      HPI   Montse Cordova is a 65 year old female who resides in a long term care facility who presents to the ED with hypoxia. She has had a wet cough and hypoxia over the past week. She put on nasal canula at her care facility which is new for her. They did take a chest x ray a couple days ago which she tells me had nothing acute, so she was not started on antibiotics. She is non ambulatory due to a previous stroke. She is no on any anticoagulation. She also has a chronic pressure ulcer on her sacrum. It is reported that there covid positive people at this care facility. She denies any headache, sore throat, chest pain, abdominal pain, diarrhea, and other issues.     Allergies:  Monosodium glutamate      Medications:    The patient is not currently taking any prescribed medications.     Past Medical History:    The patient does not have any past pertinent medical history.     Past Surgical History:    History reviewed. No pertinent surgical history.     Family History:    History reviewed. No pertinent family history.      Social History:  Smoking status: not on file   Alcohol use: not on file   Drug use: No  PCP: No primary care provider on file.  Presents via ambulance.   Marital Status:   [4]     Review of Systems   HENT: Negative for sore throat.    Respiratory: Positive for shortness of breath.    Cardiovascular: Negative for chest pain.   Gastrointestinal: Negative for abdominal pain and diarrhea.   Neurological: Negative for headaches.   All other systems reviewed and are negative.      Physical Exam     Patient Vitals for the past 24 hrs:   BP Temp Temp src Pulse Resp SpO2 Weight   11/04/20 0600 122/75 -- -- 101 11 99 % --   11/04/20 0545 126/80 -- -- 104 16 96 % --   11/04/20 0530 118/79 -- -- 103 11 97 % --   11/04/20 0515 -- -- -- 114 11 98 % --   11/04/20 0507 -- -- -- 108 16 92 % --   11/04/20 0500 130/87 -- -- 106 18 97 % --    11/04/20 0458 -- -- -- -- -- 98 % --   11/04/20 0455 -- -- -- 111 10 92 % --   11/04/20 0450 -- -- -- 117 -- (!) 86 % --   11/04/20 0445 (!) 129/98 -- -- 118 -- (!) 86 % --   11/04/20 0440 -- -- -- 116 -- (!) 88 % --   11/04/20 0439 -- -- -- -- 12 (!) 89 % --   11/04/20 0432 -- -- -- -- -- -- 105.2 kg (232 lb)   11/04/20 0431 115/89 99.4  F (37.4  C) Oral 118 25 (!) 88 % --      Physical Exam    Physical Exam   Constitutional:  Patient is oriented to person, place, and time. They appear well-developed and well-nourished. Mild distress secondary to shortness of breath   HENT:   Mouth/Throat:   Oropharynx is clear and moist.   Eyes:    Conjunctivae normal and EOM are normal. Pupils are equal, round, and reactive to light.   Neck:    Normal range of motion.   Cardiovascular: Normal rate, regular rhythm and normal heart sounds.  Exam reveals no gallop and no friction rub.  No murmur heard.  Pulmonary/Chest:  Patient is mild respiratory distress. Oxygen saturations are 85% on a mask, but she tells me that she does not feel shortness of breath at this time. Patient has a very wet  sounding cough with rhonchi.   Abdominal:   Soft. Bowel sounds are normal. Patient exhibits no mass. There is no tenderness. There is no rebound and no guarding.   Musculoskeletal:  Normal range of motion. Patient exhibits no edema.   Neurological:   Patient is alert and oriented to person, place, and time. Generally weak. No cranial nerve deficit or sensory deficit. GCS 15  Skin:   Reported sacral chronic pressure wound.   Psychiatric:   Patient has a normal mood     Emergency Department Course   ECG (04:28:12):  Rate 118 bpm. NJ interval 126. QRS duration 80. QT/QTc 322/451. P-R-T axes 61 52 29. Sinus tachycardia. Otherwise normal ECG. Interpreted at 0435 by Christi Frederick MD.     Imaging:  Radiographic findings were communicated with the patient who voiced understanding of the findings.  XR Chest Port 1 View   IMPRESSION: Partial  obscuration cardiac silhouette. Bilateral interstitial and airspace infiltrates greater in the lung bases. Small left effusion not excluded. Atherosclerotic aorta.  As read by Radiology.    Laboratory:  BNP: 126  CRP inflammation: 87.3 (H)  CBC: WBC 14.5 (H), HGB 10.4 (L), WNL ()  CMP: Carbon Dioxide 33 (H), Glucose 128 (H), Albumin 2.4 (L), WNL (Creatinine 0.53)  Magnesium: 1.7  Lactic acid whole blood (0511): 1.3  Blood gas venous: pH 7.46 (H), pCO2 51 (H), Bicarbonate 36 (H)   Troponin (0442): <0.015  Blood Culture x2: Pending     Symptomatic COVID-19 Virus (Coronavirus) by PCR Nasopharyngeal swab: Pending     Interventions:  0447, NS 1L IV Lactated Ringers Bolus   0539, Decadron, 10 mg, IV  0540, Rocephin, 2 g, IV  0623, Zithromax, 500 mg, IV    Emergency Department Course:  Patient arrived via ambulance.     Past medical records, nursing notes, and vitals reviewed.  0425: I performed an exam of the patient and obtained history, as documented above.     IV inserted and blood drawn. Patient on high flow oxygen. 100% FIO2 at 100% oxygen saturation.       The patient had a portable chest x-ray performed, findings above.     0509: I rechecked the patient. Explained findings to patient.   Findings and plan explained to the Patient who consents to admission. Discussed the patient with Dr. Grissom, who will admit the patient to a [facility] bed for further monitoring, evaluation, and treatment.     Impression & Plan    CMS Diagnoses:   The patient has signs of Severe Sepsis     If one the following conditions is present, a 30 mL/kg bolus is recommended as part of the 6 hour bundle (IBW can be used for BMI >30, or document refusal/contraindication):      1.   Initial hypotension  defined as 2 bps < 90 or map < 65 in the 6hrs before or 6hrs after time zero.     2.  Lactate >4.     The patient has signs of Severe Sepsis as evidenced by:    1. 2 SIRS criteria, AND  2. Suspected infection, AND   3. Organ dysfunction:  SBP  <90, MAP < 65, or SBP decrease of >40 from baseline due to infection    Time severe sepsis diagnosis confirmed: 0442 11/04/20 as this was the time when Lactate resulted, and the level was > 2.0 and Lab results revealing acute organ dysfunction due to infection (Cr, Bili, Plt)    3 Hour Severe Sepsis Bundle Completion:    1. Initial Lactic Acid Result:   Recent Labs   Lab Test 11/04/20  0442   LACT 1.3     2. Blood Cultures before Antibiotics: Yes  3. Broad Spectrum Antibiotics Administered:  yes       Anti-infectives (From admission through now)    Start     Dose/Rate Route Frequency Ordered Stop    11/04/20 0540  cefTRIAXone (ROCEPHIN) 2 g vial to attach to  ml bag for ADULTS or NS 50 ml bag for PEDS      2 g  over 30 Minutes Intravenous ONCE 11/04/20 0536 11/04/20 0622 11/04/20 0540  azithromycin (ZITHROMAX) 500 mg vial to attach to  mL bag      500 mg  over 1 Hours Intravenous ONCE 11/04/20 0536            4. Fluid volume administered in ED:  Full 30 mL/kg bolus given (see amount below).    BMI Readings from Last 1 Encounters:   No data found for BMI     30 mL/kg fluids based on weight: 3,160 mL  30 mL/kg fluids based on IBW (must be >= 60 inches tall): Patient ideal weight not available.                Severe Sepsis reassessment:  1. Repeat Lactic Acid Level: N/A  2. MAP>65 after initial IVF bolus, will continue to monitor fluid status and vital signs    I attest to having performed a repeat sepsis exam and assessment of perfusion at 0509 and the results demonstrate improved perfusion. and None    Covid-19  Montse Cordova was evaluated during a global COVID-19 pandemic, which necessitated consideration that the patient might be at risk for infection with the SARS-CoV-2 virus that causes COVID-19.   Applicable protocols for evaluation were followed during the patient's care.  COVID-19 was considered as part of the patient's evaluation. The plan for testing is: a test was obtained during this  visit.     Medical Decision Making:  Montse Cordova is a 65 year old female being sent in from her care facility for hypoxia and productive cough. The patient herself states that she does not feel short of breath, however her facility put her on oxygen over the last week due to hypoxia. She came on with an oxy mask at 8L. When she initially arrived here we put her on nasal canula at 12L and she was still in the upper 80s so switched her over to high flow nasal canula immediately. At high flow, 45 L/min at 100% she saturated appropriately. Blood work and imaging was obtained as well as coronavirus testing. She does have a leukocytosis with a left shift. She has no acute metabolic derangement. Her lactic acid and magnesium are normal. Her cardiac enzyme is WNL. Her EKG did not show any acute ischemia. Her CRP is significantly elevated. VBG shows a pH 7.46, normal oxygen, slightly elevated pCO2. The high flow nasal canula should help wash some of this out. Chest x ray is consistent with bilateral pneumonia, covid versus bacterial. I gave antibiotics as well as Decadron being that she is requiring supplemental O2 and is coming from a facility with COVID. She is receiving lactated ringers maintenance fluid with intent not to give her fluid overload. At this time, she is stabilized I will admit her to [facility].     Diagnosis:    ICD-10-CM    1. Pneumonia of both lungs due to infectious organism, unspecified part of lung  J18.9    2. Person under investigation for COVID-19  Z20.828    3. Hypoxia  R09.02    4. Sepsis, due to unspecified organism, unspecified whether acute organ dysfunction present (H)  A41.9        Disposition:  Patient admitted to a covid bed.     Scribe Disclosure:  IBryant, am serving as a scribe at 4:47 AM on 11/4/2020 to document services personally performed by Christi Frederick MD based on my observations and the provider's statements to me.      Bryant Chaves  11/4/2020   MONIQUE  [facility] EMERGENCY DEPT       Christi Frederick MD  11/05/20 9131

## 2020-11-04 NOTE — PLAN OF CARE
Pt arrived from ED at 1320; Stable vitals, oxygen saturations 97% on 100% oxygen at 50 liters.   Pt has stage four pressure ulcer on coccyx, WOC consulted and pulsate air mattress ordered STAT.

## 2020-11-04 NOTE — ED NOTES
Pt removed R hand IV while adjusting blankets, bandage provided. RN at bedside assisting pt to eat and drink for ~15 minutes. Pt updated on plan of care.

## 2020-11-04 NOTE — PLAN OF CARE
Pt with asymptomatic bradycardia this morning, now resolved, metoprolol dc'd. Pt to discharge with event monitor this evening.

## 2020-11-04 NOTE — ED TRIAGE NOTES
Pt coming from [facility] with reports of SOB and low saturation that has been worsening over last week. Pt not normally on oxygen but has required it over the last week. Pt was on 6L on nasal cannula at 83% pta. Patient has wet cough. Patient also has pressure ulcer to her backside.

## 2020-11-04 NOTE — PROGRESS NOTES
[facility]    Hospitalist Progress Note    Assessment & Plan   65 year old female who was admitted on 11/4/2020 with bilateral pneumonia and respiratory failure, Covid 19 negative, but high suspicion:    Impression:   Principal Problem:    Bilateral Pneumonia, Right and Left Lower Lobes   -- continue antibiotics      Acute Resp failure with hypoxia    -- on high flow nasal canula O2    Active Problems:    Person under investigation for COVID-19   -- repeat Covid test in 2 days       History of stroke w residual Left hemiparesis      Chronic obstructive pulmonary disease       Plan:  As outlined by Dr. Grissom     DVT Prophylaxis: Loveox  Code Status: No CPR- Do NOT Intubate    Disposition: Expected discharge in several days    Sammy Gandhi MD  Pager [phone]  Cell Phone [phone]  Text Page (7am to 6pm)    Interval History   Per Dr. Grissom's note    Physical Exam   Temp: 98.6  F (37  C) Temp src: Oral BP: 119/77 Pulse: 98   Resp: 13 SpO2: 96 % O2 Device: High Flow Nasal Cannula (HFNC) Oxygen Delivery: 50 LPM  Vitals:    11/04/20 0432   Weight: 105.2 kg (232 lb)     Vital Signs with Ranges  Temp:  [98.6  F (37  C)-99.4  F (37.4  C)] 98.6  F (37  C)  Pulse:  [] 98  Resp:  [9-29] 13  BP: ()/() 119/77  FiO2 (%):  [100 %] 100 %  SpO2:  [86 %-100 %] 96 %  No intake/output data recorded.    # Pain Assessment:  Current Pain Score 11/4/2020   Patient currently in pain? yes   Pain score (0-10) 10       (per Dr. Grissom)  VITAL SIGNS:  Temperature 99.4, heart rate 118, respirations 25, blood pressure 115/89, sats are 88% on room air.   GENERAL:  The patient is a heavyset 65-year-old  female.  The patient is on high-flow oxygen.  She is oriented x3.   HEENT:  Oropharynx:  Mucous membranes are moist.  Pupils are equal.   NECK:  Veins are difficult to assess.   PULMONARY:  Clear to auscultation.   CARDIOVASCULAR:  S1, S2, regular rate and rhythm.    GASTROINTESTINAL:  Abdomen is obese.  Soft, nontender, normoactive bowel sounds.   MUSCULOSKELETAL:  No edema.   NEUROLOGIC:  She is able to move all 4 extremities.  Cranial nerves grossly intact.  She is oriented x3.   SKIN:  Warm, dry, well perfused.   PSYCHIATRIC:  Mood and affect stable and normal.     Medications     lactated ringers 100 mL/hr at 11/04/20 1410     - MEDICATION INSTRUCTIONS -         albuterol  2 puff Inhalation 4x Daily     [START ON 11/5/2020] azithromycin  250 mg Oral Daily     [START ON 11/5/2020] cefTRIAXone  1 g Intravenous Q24H     [START ON 11/5/2020] dexamethasone  6 mg Intravenous Daily     polyethylene glycol  17 g Oral Daily     senna-docusate  1 tablet Oral BID    Or     senna-docusate  2 tablet Oral BID     sodium chloride (PF)  3 mL Intracatheter Q8H     sodium chloride (PF)  3 mL Intracatheter Q8H       Data   Recent Labs   Lab 11/04/20  1336 11/04/20  0442   WBC 17.0* 14.5*   HGB 9.9* 10.4*   MCV 93 93    441   INR 1.20*  --     137   POTASSIUM 4.2 3.5   CHLORIDE 103 100   CO2 31 33*   BUN 17 21   CR 0.37* 0.53   ANIONGAP 5 4   JUVENCIO 9.3 9.2   * 128*   ALBUMIN 2.1* 2.4*   PROTTOTAL 6.9 7.0   BILITOTAL 0.2 0.3   ALKPHOS 117 116   ALT 35 37   AST 17 16   TROPI <0.015 <0.015       Imaging:   Recent Results (from the past 24 hour(s))   XR Chest Port 1 View    Narrative    EXAM: XR CHEST PORT 1 VW  LOCATION: [facility]  DATE/TIME: 11/4/2020 5:07 AM    INDICATION: Dyspnea  COMPARISON: None.      Impression    IMPRESSION: Partial obscuration cardiac silhouette. Bilateral interstitial and airspace infiltrates greater in the lung bases. Small left effusion not excluded. Atherosclerotic aorta.

## 2020-11-05 LAB
ANION GAP SERPL CALCULATED.3IONS-SCNC: 3 MMOL/L (ref 3–14)
AT III ACT/NOR PPP CHRO: 113 % (ref 85–135)
BASOPHILS # BLD AUTO: 0 10E9/L (ref 0–0.2)
BASOPHILS NFR BLD AUTO: 0.1 %
BUN SERPL-MCNC: 15 MG/DL (ref 7–30)
CALCIUM SERPL-MCNC: 9.5 MG/DL (ref 8.5–10.1)
CHLORIDE SERPL-SCNC: 105 MMOL/L (ref 94–109)
CO2 SERPL-SCNC: 33 MMOL/L (ref 20–32)
CREAT SERPL-MCNC: 0.4 MG/DL (ref 0.52–1.04)
CRP SERPL-MCNC: 90 MG/L (ref 0–8)
D DIMER PPP FEU-MCNC: 2.1 UG/ML FEU (ref 0–0.5)
DIFFERENTIAL METHOD BLD: ABNORMAL
EOSINOPHIL # BLD AUTO: 0.1 10E9/L (ref 0–0.7)
EOSINOPHIL NFR BLD AUTO: 0.5 %
ERYTHROCYTE [DISTWIDTH] IN BLOOD BY AUTOMATED COUNT: 13.7 % (ref 10–15)
FIBRINOGEN PPP-MCNC: 682 MG/DL (ref 200–420)
GFR SERPL CREATININE-BSD FRML MDRD: >90 ML/MIN/{1.73_M2}
GLUCOSE SERPL-MCNC: 105 MG/DL (ref 70–99)
HCT VFR BLD AUTO: 30 % (ref 35–47)
HGB BLD-MCNC: 9.5 G/DL (ref 11.7–15.7)
IL6 SERPL-MCNC: 11.11 PG/ML
IMM GRANULOCYTES # BLD: 0.1 10E9/L (ref 0–0.4)
IMM GRANULOCYTES NFR BLD: 0.5 %
INR PPP: 1.25 (ref 0.86–1.14)
LDH SERPL L TO P-CCNC: 134 U/L (ref 81–234)
LYMPHOCYTES # BLD AUTO: 1.4 10E9/L (ref 0.8–5.3)
LYMPHOCYTES NFR BLD AUTO: 13 %
MCH RBC QN AUTO: 29.3 PG (ref 26.5–33)
MCHC RBC AUTO-ENTMCNC: 31.7 G/DL (ref 31.5–36.5)
MCV RBC AUTO: 93 FL (ref 78–100)
MONOCYTES # BLD AUTO: 0.6 10E9/L (ref 0–1.3)
MONOCYTES NFR BLD AUTO: 5.5 %
NEUTROPHILS # BLD AUTO: 8.8 10E9/L (ref 1.6–8.3)
NEUTROPHILS NFR BLD AUTO: 80.4 %
NRBC # BLD AUTO: 0 10*3/UL
NRBC BLD AUTO-RTO: 0 /100
PLATELET # BLD AUTO: 412 10E9/L (ref 150–450)
POTASSIUM SERPL-SCNC: 3.6 MMOL/L (ref 3.4–5.3)
RBC # BLD AUTO: 3.24 10E12/L (ref 3.8–5.2)
RETICS # AUTO: 51.8 10E9/L (ref 25–95)
RETICS/RBC NFR AUTO: 1.6 % (ref 0.5–2)
SODIUM SERPL-SCNC: 141 MMOL/L (ref 133–144)
TROPONIN I SERPL-MCNC: <0.015 UG/L (ref 0–0.04)
WBC # BLD AUTO: 10.9 10E9/L (ref 4–11)

## 2020-11-05 PROCEDURE — 86140 C-REACTIVE PROTEIN: CPT | Performed by: INTERNAL MEDICINE

## 2020-11-05 PROCEDURE — 999N000157 HC STATISTIC RCP TIME EA 10 MIN

## 2020-11-05 PROCEDURE — 99207 PR CDG-MDM COMPONENT: MEETS MODERATE - UP CODED: CPT | Performed by: INTERNAL MEDICINE

## 2020-11-05 PROCEDURE — 120N000004 HC R&B MS OVERFLOW

## 2020-11-05 PROCEDURE — 258N000003 HC RX IP 258 OP 636: Performed by: INTERNAL MEDICINE

## 2020-11-05 PROCEDURE — 80048 BASIC METABOLIC PNL TOTAL CA: CPT | Performed by: INTERNAL MEDICINE

## 2020-11-05 PROCEDURE — 250N000013 HC RX MED GY IP 250 OP 250 PS 637: Performed by: INTERNAL MEDICINE

## 2020-11-05 PROCEDURE — 250N000011 HC RX IP 250 OP 636: Performed by: INTERNAL MEDICINE

## 2020-11-05 PROCEDURE — 36415 COLL VENOUS BLD VENIPUNCTURE: CPT | Performed by: INTERNAL MEDICINE

## 2020-11-05 PROCEDURE — 999N000215 HC STATISTIC HFNC ADULT NON-CPAP

## 2020-11-05 PROCEDURE — 85610 PROTHROMBIN TIME: CPT | Performed by: INTERNAL MEDICINE

## 2020-11-05 PROCEDURE — 85384 FIBRINOGEN ACTIVITY: CPT | Performed by: INTERNAL MEDICINE

## 2020-11-05 PROCEDURE — 85025 COMPLETE CBC W/AUTO DIFF WBC: CPT | Performed by: INTERNAL MEDICINE

## 2020-11-05 PROCEDURE — 85045 AUTOMATED RETICULOCYTE COUNT: CPT | Performed by: INTERNAL MEDICINE

## 2020-11-05 PROCEDURE — 99233 SBSQ HOSP IP/OBS HIGH 50: CPT | Performed by: INTERNAL MEDICINE

## 2020-11-05 PROCEDURE — 85379 FIBRIN DEGRADATION QUANT: CPT | Performed by: INTERNAL MEDICINE

## 2020-11-05 PROCEDURE — 83615 LACTATE (LD) (LDH) ENZYME: CPT | Performed by: INTERNAL MEDICINE

## 2020-11-05 PROCEDURE — 84484 ASSAY OF TROPONIN QUANT: CPT | Performed by: INTERNAL MEDICINE

## 2020-11-05 PROCEDURE — G0463 HOSPITAL OUTPT CLINIC VISIT: HCPCS

## 2020-11-05 RX ADMIN — DOCUSATE SODIUM 50 MG AND SENNOSIDES 8.6 MG 2 TABLET: 8.6; 5 TABLET, FILM COATED ORAL at 08:44

## 2020-11-05 RX ADMIN — QUETIAPINE 25 MG: 25 TABLET ORAL at 14:40

## 2020-11-05 RX ADMIN — APIXABAN 5 MG: 5 TABLET, FILM COATED ORAL at 21:05

## 2020-11-05 RX ADMIN — SODIUM CHLORIDE, POTASSIUM CHLORIDE, SODIUM LACTATE AND CALCIUM CHLORIDE: 600; 310; 30; 20 INJECTION, SOLUTION INTRAVENOUS at 08:49

## 2020-11-05 RX ADMIN — PANTOPRAZOLE SODIUM 40 MG: 40 TABLET, DELAYED RELEASE ORAL at 08:44

## 2020-11-05 RX ADMIN — POLYETHYLENE GLYCOL 3350 17 G: 17 POWDER, FOR SOLUTION ORAL at 08:42

## 2020-11-05 RX ADMIN — QUETIAPINE 25 MG: 25 TABLET ORAL at 21:05

## 2020-11-05 RX ADMIN — DULOXETINE HYDROCHLORIDE 40 MG: 20 CAPSULE, DELAYED RELEASE ORAL at 21:05

## 2020-11-05 RX ADMIN — APIXABAN 5 MG: 5 TABLET, FILM COATED ORAL at 08:44

## 2020-11-05 RX ADMIN — CEFTRIAXONE SODIUM 1 G: 1 INJECTION, POWDER, FOR SOLUTION INTRAMUSCULAR; INTRAVENOUS at 05:47

## 2020-11-05 RX ADMIN — QUETIAPINE 25 MG: 25 TABLET ORAL at 08:44

## 2020-11-05 RX ADMIN — TIZANIDINE 6 MG: 2 TABLET ORAL at 14:39

## 2020-11-05 RX ADMIN — HYDROCODONE BITARTRATE AND ACETAMINOPHEN 1 TABLET: 5; 325 TABLET ORAL at 08:44

## 2020-11-05 RX ADMIN — HYDROCODONE BITARTRATE AND ACETAMINOPHEN 1 TABLET: 5; 325 TABLET ORAL at 16:23

## 2020-11-05 RX ADMIN — GABAPENTIN 600 MG: 600 TABLET, FILM COATED ORAL at 08:44

## 2020-11-05 RX ADMIN — ALBUTEROL SULFATE 2 PUFF: 90 AEROSOL, METERED RESPIRATORY (INHALATION) at 21:05

## 2020-11-05 RX ADMIN — TIZANIDINE 6 MG: 2 TABLET ORAL at 21:05

## 2020-11-05 RX ADMIN — AZITHROMYCIN MONOHYDRATE 250 MG: 250 TABLET ORAL at 08:44

## 2020-11-05 RX ADMIN — ALBUTEROL SULFATE 2 PUFF: 90 AEROSOL, METERED RESPIRATORY (INHALATION) at 16:25

## 2020-11-05 RX ADMIN — FLUTICASONE FUROATE AND VILANTEROL TRIFENATATE 1 PUFF: 200; 25 POWDER RESPIRATORY (INHALATION) at 08:38

## 2020-11-05 RX ADMIN — GABAPENTIN 900 MG: 300 CAPSULE ORAL at 21:04

## 2020-11-05 RX ADMIN — MONTELUKAST 10 MG: 10 TABLET, FILM COATED ORAL at 21:05

## 2020-11-05 RX ADMIN — GABAPENTIN 600 MG: 600 TABLET, FILM COATED ORAL at 14:40

## 2020-11-05 RX ADMIN — DEXAMETHASONE SODIUM PHOSPHATE 6 MG: 4 INJECTION, SOLUTION INTRAMUSCULAR; INTRAVENOUS at 08:43

## 2020-11-05 RX ADMIN — ALBUTEROL SULFATE 2 PUFF: 90 AEROSOL, METERED RESPIRATORY (INHALATION) at 12:44

## 2020-11-05 RX ADMIN — HYDROCODONE BITARTRATE AND ACETAMINOPHEN 1 TABLET: 5; 325 TABLET ORAL at 21:05

## 2020-11-05 RX ADMIN — ALBUTEROL SULFATE 2 PUFF: 90 AEROSOL, METERED RESPIRATORY (INHALATION) at 08:38

## 2020-11-05 RX ADMIN — TIZANIDINE 6 MG: 2 TABLET ORAL at 08:44

## 2020-11-05 RX ADMIN — DICLOFENAC SODIUM 2 G: 10 GEL TOPICAL at 14:46

## 2020-11-05 RX ADMIN — FUROSEMIDE 20 MG: 20 TABLET ORAL at 08:44

## 2020-11-05 RX ADMIN — SODIUM CHLORIDE, POTASSIUM CHLORIDE, SODIUM LACTATE AND CALCIUM CHLORIDE: 600; 310; 30; 20 INJECTION, SOLUTION INTRAVENOUS at 18:58

## 2020-11-05 RX ADMIN — DICLOFENAC SODIUM 2 G: 10 GEL TOPICAL at 08:40

## 2020-11-05 RX ADMIN — DICLOFENAC SODIUM 2 G: 10 GEL TOPICAL at 21:06

## 2020-11-05 RX ADMIN — HYDROCODONE BITARTRATE AND ACETAMINOPHEN 1 TABLET: 5; 325 TABLET ORAL at 12:43

## 2020-11-05 RX ADMIN — ATORVASTATIN CALCIUM 40 MG: 40 TABLET, FILM COATED ORAL at 21:05

## 2020-11-05 ASSESSMENT — ACTIVITIES OF DAILY LIVING (ADL)
ADLS_ACUITY_SCORE: 23
ADLS_ACUITY_SCORE: 21

## 2020-11-05 NOTE — CONSULTS
[facility]  WO Nurse Inpatient Wound Assessment     Reason for consultation: Evaluate and treat coccyx/sacrum     Assessment  Coccyx/sacrum: chronic Stage 4 pressure injury; approx golf-ball size crater with small area of exposed bone palpated in the base.   No acute s/s infection, minimal necrotic tissue, but looks dry and stalled.  Per chart review, wound was debrided in June 2020 and followed at [facility] wound clinic, had wound vac for a while until pt kept ripping it off.  More recent history is less clear, pt states she has facility staff cleaning and dressing wound daily, but unknown if any wound clinic or specialist is currently involved.  Pt would definitely benefit from a return to wound clinic; may need wound vac therapy restarted if tolerated.  For now will start daily Vashe-moist gauze dressings to help with bioburden and autolytic debridement.     Treatment Plan  Coccyx: Daily and prn:  1.  Cleanse with Vashe-moist gauze  2.  Swab periwound with Cavilon no-sting barrier, let dry  3.  Pack wound with 1-2 Vashe-moistened gauze fluffs  4.  Cover with a folded 4x4 dry gauze and a Mepilex 4x4  5.  PIP measures; Pulsate mattress    Pt should follow-up at wound clinic if possible. (Previously followed at [facility])    Orders Written  WO Nurse follow-up plan: 1-2x week and prn  Nursing to notify the Provider(s) and re-consult the WO Nurse if wound(s) deteriorates or new skin concern.    Patient History  According to provider note(s):  Montse Cordova is a 65-year-old  female who lives in long-term care facility who is debilitated from a stroke who is nonambulatory who has had positive sick contacts from other residents who have had COVID, presents to [facility] Emergency Department with hypoxia.     Objective Data  Containment of urine/stool: Incontinence Protocol    Active Diet Order:  Orders Placed This Encounter      Combination Diet Regular Diet Adult    Output:    I/O last 3 completed shifts:  In: 2525 [P.O.:120; I.V.:2405]  Out: 1000 [Urine:1000]    Risk Assessment:   Sensory Perception: 3-->slightly limited  Moisture: 3-->occasionally moist  Activity: 2-->chairfast  Mobility: 2-->very limited  Nutrition: 2-->probably inadequate  Friction and Shear: 2-->potential problem  [place] Score: 14                          Labs:   Recent Labs   Lab 11/05/20  0717 11/04/20  1336   ALBUMIN  --  2.1*   HGB 9.5* 9.9*   INR 1.25* 1.20*   WBC 10.9 17.0*   CRP 90.0* 111.0*       Physical Exam  Skin inspection: focused coccyx    Wound Location:  Coccyx   Date of last photo:  11-5-20 (with flash)      11-5-20 (without flash)      Wound History: see above; pt only wants to lie on left side  Measurements (length x width x depth, in cm): approx 3.5 x 3.5 x 3cm   Wound Base: semi-moist pink smooth tissue; rough bone in central base; fibrous pink/yellow tissue  Tunneling: N/A  Undermining: scattered, up to 1-2cm, especially 10-4 o'clock  Palpation of the wound bed: normal and firm rough bone  Periwound skin: dry/scaly and erythema- blanchable; scarred irregular contours  Color: pink  Temperature: normal   Drainage: moderate  Description of drainage: serosanguinous, green and yellow  Odor: minimal  Pain: mild to moderate at times    Interventions  Current support surface: Pulsate  Current off-loading measures: Pillows  Visual inspection of wound(s) completed  Wound Care: done per plan of care  Supplies: reviewed, gathered and placed at the bedside  Education provided: importance of repositioning, plan of care, wound progress, Infection prevention , Off-loading pressure and follow-up recommendations  Discussed plan of care with Patient and Nurse    Nandini Alejandro RN, CWOCN

## 2020-11-05 NOTE — PLAN OF CARE
A&O x2. VSS on HFNC. Up with A2/lift. Tolerating regular diet, needs assistance with feeding. Tele SR. LR infusing at 100 ml/hr with intermittent abx. Dressing to bottom CDI.  Pain controlled with Norco. Plan to continue abx, and plan for reswab in 2 days. WOC also consulted for pressure ulcer on bottom. Continue to monitor.

## 2020-11-05 NOTE — PLAN OF CARE
Pt down to 85% oxygen, still on 50 liters, sating >95%. Complaining of back and right arm pain [place] administered. Purewick in place incontinent of bowel and bladder. PUP measures maintained.

## 2020-11-05 NOTE — PROGRESS NOTES
[facility]    Hospitalist Progress Note    Assessment & Plan   65 year old female who was admitted on 11/4/2020 with bilateral pneumonia and respiratory failure, Covid 19 negative, but high suspicion:    Impression:   Principal Problem:    Bilateral Pneumonia, Right and Left Lower Lobes   -- continue antibiotics      Acute Resp failure with hypoxia    -- on high flow nasal canula O2    Active Problems:    Person under investigation for COVID-19   -- repeat Covid test in 2 days       History of stroke w residual Left hemiparesis      Chronic obstructive pulmonary disease       Plan:  Continue antibiotics, and decadron while awaiting repeat Covid test.     DVT Prophylaxis: Loveox  Code Status: No CPR- Do NOT Intubate    Disposition: Expected discharge in several days    Sammy Gandhi MD  Pager [phone]  Cell Phone [phone]  Text Page (7am to 6pm)    Interval History   Breathing better, still on high flow nasal canula O2.     Physical Exam   Temp: 98.5  F (36.9  C) Temp src: Oral BP: 101/63 Pulse: 74   Resp: 17 SpO2: 95 % O2 Device: High Flow Nasal Cannula (HFNC) Oxygen Delivery: 35 LPM  Vitals:    11/04/20 0432   Weight: 105.2 kg (232 lb)     Vital Signs with Ranges  Temp:  [97.9  F (36.6  C)-99  F (37.2  C)] 98.5  F (36.9  C)  Pulse:  [] 74  Resp:  [13-27] 17  BP: ()/() 101/63  FiO2 (%):  [45 %-100 %] 50 %  SpO2:  [90 %-100 %] 95 %  I/O last 3 completed shifts:  In: 625 [P.O.:120; I.V.:505]  Out: 600 [Urine:600]    # Pain Assessment:  Current Pain Score 11/5/2020   Patient currently in pain? yes   Pain score (0-10) 8       BP (!) 165/85 (BP Location: Right arm)   Pulse 71   Temp 97.6  F (36.4  C) (Oral)   Resp 20   Wt 105.2 kg (232 lb)   SpO2 92%    GENERAL:  Alert, in no distress   PULMONARY:  Clear to auscultation.   CARDIOVASCULAR:  S1, S2, regular rate and rhythm.   GASTROINTESTINAL:  Abdomen is obese.  Soft, nontender, normal bowel sounds.    MUSCULOSKELETAL:  No edema.   NEUROLOGIC:  Alert, Ox2.5, left hemiplegia, unable to sit up in bed 2nd to weakness  SKIN:  large sacral ulcer (per nursing)    Medications     lactated ringers 100 mL/hr at 11/05/20 0849     - MEDICATION INSTRUCTIONS -         albuterol  2 puff Inhalation 4x Daily     apixaban ANTICOAGULANT  5 mg Oral BID     atorvastatin  40 mg Oral At Bedtime     azithromycin  250 mg Oral Daily     cefTRIAXone  1 g Intravenous Q24H     dexamethasone  6 mg Intravenous Daily     diclofenac  2 g Topical TID     DULoxetine  40 mg Oral QPM     fluticasone-vilanterol  1 puff Inhalation Daily     furosemide  20 mg Oral QAM     gabapentin  900 mg Oral At Bedtime     gabapentin  600 mg Oral BID     montelukast  10 mg Oral At Bedtime     pantoprazole  40 mg Oral Daily     polyethylene glycol  17 g Oral Daily     QUEtiapine  25 mg Oral TID     senna-docusate  1 tablet Oral BID    Or     senna-docusate  2 tablet Oral BID     sodium chloride (PF)  3 mL Intracatheter Q8H     sodium chloride (PF)  3 mL Intracatheter Q8H     tiZANidine  6 mg Oral TID       Data   Recent Labs   Lab 11/05/20  0717 11/04/20  1336 11/04/20  0442   WBC 10.9 17.0* 14.5*   HGB 9.5* 9.9* 10.4*   MCV 93 93 93    435 441   INR 1.25* 1.20*  --     139 137   POTASSIUM 3.6 4.2 3.5   CHLORIDE 105 103 100   CO2 33* 31 33*   BUN 15 17 21   CR 0.40* 0.37* 0.53   ANIONGAP 3 5 4   JUVENCIO 9.5 9.3 9.2   * 145* 128*   ALBUMIN  --  2.1* 2.4*   PROTTOTAL  --  6.9 7.0   BILITOTAL  --  0.2 0.3   ALKPHOS  --  117 116   ALT  --  35 37   AST  --  17 16   TROPI <0.015 <0.015 <0.015       Imaging:   No results found for this or any previous visit (from the past 24 hour(s)).

## 2020-11-06 LAB
ANION GAP SERPL CALCULATED.3IONS-SCNC: 4 MMOL/L (ref 3–14)
BUN SERPL-MCNC: 18 MG/DL (ref 7–30)
CALCIUM SERPL-MCNC: 9.3 MG/DL (ref 8.5–10.1)
CHLORIDE SERPL-SCNC: 105 MMOL/L (ref 94–109)
CO2 SERPL-SCNC: 32 MMOL/L (ref 20–32)
CREAT SERPL-MCNC: 0.42 MG/DL (ref 0.52–1.04)
ERYTHROCYTE [DISTWIDTH] IN BLOOD BY AUTOMATED COUNT: 13.7 % (ref 10–15)
GFR SERPL CREATININE-BSD FRML MDRD: >90 ML/MIN/{1.73_M2}
GLUCOSE SERPL-MCNC: 120 MG/DL (ref 70–99)
HCT VFR BLD AUTO: 29.9 % (ref 35–47)
HGB BLD-MCNC: 9.2 G/DL (ref 11.7–15.7)
LABORATORY COMMENT REPORT: NORMAL
MCH RBC QN AUTO: 28.8 PG (ref 26.5–33)
MCHC RBC AUTO-ENTMCNC: 30.8 G/DL (ref 31.5–36.5)
MCV RBC AUTO: 94 FL (ref 78–100)
PLATELET # BLD AUTO: 450 10E9/L (ref 150–450)
POTASSIUM SERPL-SCNC: 3.5 MMOL/L (ref 3.4–5.3)
RBC # BLD AUTO: 3.19 10E12/L (ref 3.8–5.2)
SARS-COV-2 RNA SPEC QL NAA+PROBE: NEGATIVE
SARS-COV-2 RNA SPEC QL NAA+PROBE: NORMAL
SODIUM SERPL-SCNC: 141 MMOL/L (ref 133–144)
SPECIMEN SOURCE: NORMAL
SPECIMEN SOURCE: NORMAL
WBC # BLD AUTO: 11.5 10E9/L (ref 4–11)

## 2020-11-06 PROCEDURE — 999N000157 HC STATISTIC RCP TIME EA 10 MIN

## 2020-11-06 PROCEDURE — 36415 COLL VENOUS BLD VENIPUNCTURE: CPT | Performed by: INTERNAL MEDICINE

## 2020-11-06 PROCEDURE — 120N000001 HC R&B MED SURG/OB

## 2020-11-06 PROCEDURE — 250N000013 HC RX MED GY IP 250 OP 250 PS 637: Performed by: INTERNAL MEDICINE

## 2020-11-06 PROCEDURE — 250N000011 HC RX IP 250 OP 636: Performed by: INTERNAL MEDICINE

## 2020-11-06 PROCEDURE — 85045 AUTOMATED RETICULOCYTE COUNT: CPT | Performed by: INTERNAL MEDICINE

## 2020-11-06 PROCEDURE — 258N000003 HC RX IP 258 OP 636: Performed by: INTERNAL MEDICINE

## 2020-11-06 PROCEDURE — 99233 SBSQ HOSP IP/OBS HIGH 50: CPT | Performed by: INTERNAL MEDICINE

## 2020-11-06 PROCEDURE — U0003 INFECTIOUS AGENT DETECTION BY NUCLEIC ACID (DNA OR RNA); SEVERE ACUTE RESPIRATORY SYNDROME CORONAVIRUS 2 (SARS-COV-2) (CORONAVIRUS DISEASE [COVID-19]), AMPLIFIED PROBE TECHNIQUE, MAKING USE OF HIGH THROUGHPUT TECHNOLOGIES AS DESCRIBED BY CMS-2020-01-R: HCPCS | Performed by: INTERNAL MEDICINE

## 2020-11-06 PROCEDURE — 99207 PR CDG-MDM COMPONENT: MEETS MODERATE - UP CODED: CPT | Performed by: INTERNAL MEDICINE

## 2020-11-06 PROCEDURE — 85027 COMPLETE CBC AUTOMATED: CPT | Performed by: INTERNAL MEDICINE

## 2020-11-06 PROCEDURE — 80048 BASIC METABOLIC PNL TOTAL CA: CPT | Performed by: INTERNAL MEDICINE

## 2020-11-06 RX ORDER — DEXAMETHASONE 4 MG/1
4 TABLET ORAL DAILY
Status: COMPLETED | OUTPATIENT
Start: 2020-11-07 | End: 2020-11-08

## 2020-11-06 RX ORDER — DEXAMETHASONE 2 MG/1
2 TABLET ORAL DAILY
Status: COMPLETED | OUTPATIENT
Start: 2020-11-09 | End: 2020-11-10

## 2020-11-06 RX ORDER — CEFTRIAXONE 2 G/1
2 INJECTION, POWDER, FOR SOLUTION INTRAMUSCULAR; INTRAVENOUS EVERY 24 HOURS
Status: DISCONTINUED | OUTPATIENT
Start: 2020-11-07 | End: 2020-11-08

## 2020-11-06 RX ADMIN — QUETIAPINE 25 MG: 25 TABLET ORAL at 08:21

## 2020-11-06 RX ADMIN — DICLOFENAC SODIUM 2 G: 10 GEL TOPICAL at 13:46

## 2020-11-06 RX ADMIN — HYDROCODONE BITARTRATE AND ACETAMINOPHEN 1 TABLET: 5; 325 TABLET ORAL at 21:09

## 2020-11-06 RX ADMIN — DOCUSATE SODIUM 50 MG AND SENNOSIDES 8.6 MG 1 TABLET: 8.6; 5 TABLET, FILM COATED ORAL at 21:09

## 2020-11-06 RX ADMIN — QUETIAPINE 25 MG: 25 TABLET ORAL at 13:46

## 2020-11-06 RX ADMIN — ALBUTEROL SULFATE 2 PUFF: 90 AEROSOL, METERED RESPIRATORY (INHALATION) at 21:23

## 2020-11-06 RX ADMIN — APIXABAN 5 MG: 5 TABLET, FILM COATED ORAL at 08:21

## 2020-11-06 RX ADMIN — TIZANIDINE 6 MG: 2 TABLET ORAL at 21:13

## 2020-11-06 RX ADMIN — SODIUM CHLORIDE, POTASSIUM CHLORIDE, SODIUM LACTATE AND CALCIUM CHLORIDE: 600; 310; 30; 20 INJECTION, SOLUTION INTRAVENOUS at 17:37

## 2020-11-06 RX ADMIN — QUETIAPINE 25 MG: 25 TABLET ORAL at 21:13

## 2020-11-06 RX ADMIN — ALBUTEROL SULFATE 2 PUFF: 90 AEROSOL, METERED RESPIRATORY (INHALATION) at 08:22

## 2020-11-06 RX ADMIN — FLUTICASONE FUROATE AND VILANTEROL TRIFENATATE 1 PUFF: 200; 25 POWDER RESPIRATORY (INHALATION) at 08:22

## 2020-11-06 RX ADMIN — TIZANIDINE 6 MG: 2 TABLET ORAL at 08:21

## 2020-11-06 RX ADMIN — MONTELUKAST 10 MG: 10 TABLET, FILM COATED ORAL at 21:09

## 2020-11-06 RX ADMIN — DICLOFENAC SODIUM 2 G: 10 GEL TOPICAL at 08:23

## 2020-11-06 RX ADMIN — SODIUM CHLORIDE, POTASSIUM CHLORIDE, SODIUM LACTATE AND CALCIUM CHLORIDE: 600; 310; 30; 20 INJECTION, SOLUTION INTRAVENOUS at 05:35

## 2020-11-06 RX ADMIN — HYDROCODONE BITARTRATE AND ACETAMINOPHEN 1 TABLET: 5; 325 TABLET ORAL at 03:22

## 2020-11-06 RX ADMIN — ALBUTEROL SULFATE 2 PUFF: 90 AEROSOL, METERED RESPIRATORY (INHALATION) at 15:41

## 2020-11-06 RX ADMIN — GABAPENTIN 600 MG: 600 TABLET, FILM COATED ORAL at 13:46

## 2020-11-06 RX ADMIN — AZITHROMYCIN MONOHYDRATE 250 MG: 250 TABLET ORAL at 08:20

## 2020-11-06 RX ADMIN — DEXAMETHASONE SODIUM PHOSPHATE 6 MG: 4 INJECTION, SOLUTION INTRAMUSCULAR; INTRAVENOUS at 08:20

## 2020-11-06 RX ADMIN — ALBUTEROL SULFATE 2 PUFF: 90 AEROSOL, METERED RESPIRATORY (INHALATION) at 11:54

## 2020-11-06 RX ADMIN — PANTOPRAZOLE SODIUM 40 MG: 40 TABLET, DELAYED RELEASE ORAL at 08:20

## 2020-11-06 RX ADMIN — POLYETHYLENE GLYCOL 3350 17 G: 17 POWDER, FOR SOLUTION ORAL at 08:19

## 2020-11-06 RX ADMIN — APIXABAN 5 MG: 5 TABLET, FILM COATED ORAL at 21:15

## 2020-11-06 RX ADMIN — GABAPENTIN 600 MG: 600 TABLET, FILM COATED ORAL at 08:21

## 2020-11-06 RX ADMIN — HYDROCODONE BITARTRATE AND ACETAMINOPHEN 1 TABLET: 5; 325 TABLET ORAL at 17:16

## 2020-11-06 RX ADMIN — FUROSEMIDE 20 MG: 20 TABLET ORAL at 08:20

## 2020-11-06 RX ADMIN — TIZANIDINE 6 MG: 2 TABLET ORAL at 13:46

## 2020-11-06 RX ADMIN — DULOXETINE HYDROCHLORIDE 40 MG: 20 CAPSULE, DELAYED RELEASE ORAL at 21:16

## 2020-11-06 RX ADMIN — ATORVASTATIN CALCIUM 40 MG: 40 TABLET, FILM COATED ORAL at 21:16

## 2020-11-06 RX ADMIN — CEFTRIAXONE SODIUM 1 G: 1 INJECTION, POWDER, FOR SOLUTION INTRAMUSCULAR; INTRAVENOUS at 05:34

## 2020-11-06 RX ADMIN — DOCUSATE SODIUM 50 MG AND SENNOSIDES 8.6 MG 2 TABLET: 8.6; 5 TABLET, FILM COATED ORAL at 08:20

## 2020-11-06 RX ADMIN — GABAPENTIN 900 MG: 300 CAPSULE ORAL at 21:11

## 2020-11-06 RX ADMIN — HYDROCODONE BITARTRATE AND ACETAMINOPHEN 1 TABLET: 5; 325 TABLET ORAL at 12:28

## 2020-11-06 RX ADMIN — DICLOFENAC SODIUM 2 G: 10 GEL TOPICAL at 21:20

## 2020-11-06 ASSESSMENT — ACTIVITIES OF DAILY LIVING (ADL)
ADLS_ACUITY_SCORE: 19
ADLS_ACUITY_SCORE: 22
ADLS_ACUITY_SCORE: 22
ADLS_ACUITY_SCORE: 19
ADLS_ACUITY_SCORE: 22
ADLS_ACUITY_SCORE: 19

## 2020-11-06 NOTE — PROGRESS NOTES
[facility]    Hospitalist Progress Note    Assessment & Plan   65 year old female who was admitted on 11/4/2020 with bilateral pneumonia and respiratory failure, Covid 19 negative, but high suspicion:      Impression:   Principal Problem:    Bilateral Pneumonia, Right and Left Lower Lobes -- appears bacterial    -- continue antibiotics (Will increase Ceftriaxone to 2 gm daily, and cont Zithromax 250 mg daily)       Acute Resp failure with hypoxia -- slight improvement   -- on high flow nasal canula O2 (will keep O2 sat 90% or higher -- has COPD so more O2 than that will just slow the taper of her Oxygen)       Large chronic Sacral decub ulcer -- has declined definitive surgical therapy   -- continue wound care     Active Problems:    COVID-19 negative x 2   -- transfer to medicine floor      History of stroke w residual Left hemiplegia      Chronic obstructive pulmonary disease       Plan:  Continue antibiotics, will wean off Decadron.     DVT Prophylaxis: Loveox  Code Status: No CPR- Do NOT Intubate    Disposition: Expected discharge in 2-3 days     Sammy Gandhi MD  Pager [phone]  Cell Phone [phone]  Text Page (7am to 6pm)    Interval History   Breathing better, still on high flow nasal canula O2.     Physical Exam   Temp: 97.6  F (36.4  C) Temp src: Oral BP: (!) 165/85 Pulse: 71   Resp: 20 SpO2: 92 % O2 Device: High Flow Nasal Cannula (HFNC) Oxygen Delivery: 35 LPM  Vitals:    11/04/20 0432   Weight: 105.2 kg (232 lb)     Vital Signs with Ranges  Temp:  [97.6  F (36.4  C)-98  F (36.7  C)] 97.6  F (36.4  C)  Pulse:  [71-79] 71  Resp:  [13-20] 20  BP: (126-165)/(70-85) 165/85  FiO2 (%):  [35 %-50 %] 35 %  SpO2:  [92 %-94 %] 92 %  I/O last 3 completed shifts:  In: 880 [P.O.:680; I.V.:200]  Out: 1225 [Urine:1225]    # Pain Assessment:  Current Pain Score 11/6/2020   Patient currently in pain? -   Pain score (0-10) 10       BP (!) 165/85 (BP Location: Right arm)   Pulse  71   Temp 97.6  F (36.4  C) (Oral)   Resp 20   Wt 105.2 kg (232 lb)   SpO2 92%    GENERAL:  Alert, in no distress   PULMONARY:  Clear to auscultation.   CARDIOVASCULAR:  S1, S2, regular rate and rhythm.   GASTROINTESTINAL:  Abdomen is obese.  Soft, nontender, normal bowel sounds.   MUSCULOSKELETAL:  No edema.   NEUROLOGIC:  Alert, Ox2.5, left hemiplegia, unable to sit up in bed 2nd to weakness  SKIN:  large sacral ulcer (per nursing)    Medications     lactated ringers 75 mL/hr at 11/06/20 0822     - MEDICATION INSTRUCTIONS -         albuterol  2 puff Inhalation 4x Daily     apixaban ANTICOAGULANT  5 mg Oral BID     atorvastatin  40 mg Oral At Bedtime     azithromycin  250 mg Oral Daily     [START ON 11/7/2020] cefTRIAXone  2 g Intravenous Q24H     diclofenac  2 g Topical TID     DULoxetine  40 mg Oral QPM     fluticasone-vilanterol  1 puff Inhalation Daily     furosemide  20 mg Oral QAM     gabapentin  900 mg Oral At Bedtime     gabapentin  600 mg Oral BID     montelukast  10 mg Oral At Bedtime     pantoprazole  40 mg Oral Daily     polyethylene glycol  17 g Oral Daily     QUEtiapine  25 mg Oral TID     senna-docusate  1 tablet Oral BID    Or     senna-docusate  2 tablet Oral BID     sodium chloride (PF)  3 mL Intracatheter Q8H     sodium chloride (PF)  3 mL Intracatheter Q8H     tiZANidine  6 mg Oral TID       Data   Recent Labs   Lab 11/06/20  0909 11/05/20  0717 11/04/20  1336 11/04/20  0442   WBC 11.5* 10.9 17.0* 14.5*   HGB 9.2* 9.5* 9.9* 10.4*   MCV 94 93 93 93    412 435 441   INR  --  1.25* 1.20*  --     141 139 137   POTASSIUM 3.5 3.6 4.2 3.5   CHLORIDE 105 105 103 100   CO2 32 33* 31 33*   BUN 18 15 17 21   CR 0.42* 0.40* 0.37* 0.53   ANIONGAP 4 3 5 4   JUVENCIO 9.3 9.5 9.3 9.2   * 105* 145* 128*   ALBUMIN  --   --  2.1* 2.4*   PROTTOTAL  --   --  6.9 7.0   BILITOTAL  --   --  0.2 0.3   ALKPHOS  --   --  117 116   ALT  --   --  35 37   AST  --   --  17 16   TROPI  --  <0.015 <0.015  <0.015       Imaging:   No results found for this or any previous visit (from the past 24 hour(s)).

## 2020-11-06 NOTE — CONSULTS
Care Management Initial Consult    General Information  Assessment completed with:  Anum in nursing at [facility]  Type of CM/SW Visit: Initial Assessment  Primary Care Provider verified and updated as needed: Yes   Readmission within the last 30 days: no previous admission in last 30 days         Advance Care Planning: Advance Care Planning Reviewed: (The pt is full code status)          Communication Assessment  Patient's communication style: spoken language (English or Bilingual)    Hearing Difficulty or Deaf: no   Wear Glasses or Blind: no    Cognitive  Cognitive/Neuro/Behavioral: WDL  Level of Consciousness: alert  Arousal Level: opens eyes spontaneously  Orientation: oriented x 4  Mood/Behavior: calm;cooperative;behavior appropriate to situation  Best Language: 0 - No aphasia  Speech: clear;spontaneous;logical    Living Environment:   People in home:       Current living Arrangements: other (see comments)(LT)  Name of Facility: [facility]   Able to return to prior arrangements: yes       Family/Social Support:  Care provided by:    Provides care for: no one, unable/limited ability to care for self     Children          Description of Support System: Involved         Current Resources:   Skilled Home Care Services:  NA   Community Resources:  NA  Equipment currently used at home: other (see comments)(The pt lives at LTC facility)  Supplies currently used at home:  NA    Employment/Financial:  Employment Status:     NA     Financial Concerns:  None noted           Lifestyle & Psychosocial Needs:        Socioeconomic History     Marital status:      Spouse name: Not on file     Number of children: Not on file     Years of education: Not on file     Highest education level: Not on file          Functional Status:  Prior to admission patient needed assistance:   Total care at LTC                       Additional Information:  Chase fowler nurse at [facility] the pt is not able to sit up  to eat.  The pt tolerates her head up at 30 degrees at the most to eat due to pain from her non healing deep Coccyx wound.  The pt usually will only sit up when her son comes to visit and can only tolerate sitting up for 10 minutes due to pain from her wound.     Anum stated the pt had this  Wound when she came to them and that the pt has refused wound vacs in the past.      The pt would need to be at baseline with her O2 requirements in order to return to [place], [place] 3-4 L due to her status of being  full code.    They prefer weekday admissions and will take admission at any time of day with preference to early evening at the latest.  Call admissions office to notify them of the pt's return, [phone], and fax orders to [phone].    Madisyn Worley RN, BSN Care Coordinator  [facility]  Mobile: [phone]

## 2020-11-06 NOTE — PROGRESS NOTES
Pt remains on 35L, 40% HFNC sats are in the mid to low 90's. BS diminished. RT will continue to monitor.

## 2020-11-06 NOTE — PLAN OF CARE
Alert to self, time, situation; disoriented to placed. Admitted with [facility] pna, first COVID swab negative; re-check tomorrow or Saturday. VSS on high flow O2 @ 50%/35L. Stage IV pressure ulcer present on coccyx, WOC consulted; see note. Pt refuses repositioning, weight shifted/boosted/position adjusted Q 2 hours on L side only per pt preference. Chronic pain improved with oral Norco. Incontinent B/B, purewick in place. BM today. Tolerating diet. Continue with current POC.

## 2020-11-06 NOTE — PLAN OF CARE
Pt is covid neg x1, plan to retest, special precaution maintained.A&O with episodes of confusion and forgetfulness.VSS on HCNC @35L.Refusing to be repositioned to her back or rt side, continues to lay on her left side, only allows weight shifting assistance and boosting.Wound on coccyx C/D/I, WOC following, daily dressing ordered.Incontinent, pure wick in place.Jose E reg diet,IVF L/R cont@100 with intermittent antibiotics.Discharge pending covid retest.

## 2020-11-07 LAB
ANION GAP SERPL CALCULATED.3IONS-SCNC: 6 MMOL/L (ref 3–14)
BUN SERPL-MCNC: 13 MG/DL (ref 7–30)
CALCIUM SERPL-MCNC: 8.8 MG/DL (ref 8.5–10.1)
CHLORIDE SERPL-SCNC: 102 MMOL/L (ref 94–109)
CO2 SERPL-SCNC: 31 MMOL/L (ref 20–32)
CREAT SERPL-MCNC: 0.54 MG/DL (ref 0.52–1.04)
ERYTHROCYTE [DISTWIDTH] IN BLOOD BY AUTOMATED COUNT: 13.6 % (ref 10–15)
GFR SERPL CREATININE-BSD FRML MDRD: >90 ML/MIN/{1.73_M2}
GLUCOSE SERPL-MCNC: 115 MG/DL (ref 70–99)
HCT VFR BLD AUTO: 31.2 % (ref 35–47)
HGB BLD-MCNC: 9.8 G/DL (ref 11.7–15.7)
MCH RBC QN AUTO: 29 PG (ref 26.5–33)
MCHC RBC AUTO-ENTMCNC: 31.4 G/DL (ref 31.5–36.5)
MCV RBC AUTO: 92 FL (ref 78–100)
PLATELET # BLD AUTO: 454 10E9/L (ref 150–450)
POTASSIUM SERPL-SCNC: 3.5 MMOL/L (ref 3.4–5.3)
RBC # BLD AUTO: 3.38 10E12/L (ref 3.8–5.2)
SODIUM SERPL-SCNC: 139 MMOL/L (ref 133–144)
WBC # BLD AUTO: 14.5 10E9/L (ref 4–11)

## 2020-11-07 PROCEDURE — 250N000013 HC RX MED GY IP 250 OP 250 PS 637: Performed by: INTERNAL MEDICINE

## 2020-11-07 PROCEDURE — 99232 SBSQ HOSP IP/OBS MODERATE 35: CPT | Performed by: STUDENT IN AN ORGANIZED HEALTH CARE EDUCATION/TRAINING PROGRAM

## 2020-11-07 PROCEDURE — 85027 COMPLETE CBC AUTOMATED: CPT | Performed by: INTERNAL MEDICINE

## 2020-11-07 PROCEDURE — 250N000012 HC RX MED GY IP 250 OP 636 PS 637: Performed by: INTERNAL MEDICINE

## 2020-11-07 PROCEDURE — 80048 BASIC METABOLIC PNL TOTAL CA: CPT | Performed by: INTERNAL MEDICINE

## 2020-11-07 PROCEDURE — 36415 COLL VENOUS BLD VENIPUNCTURE: CPT | Performed by: INTERNAL MEDICINE

## 2020-11-07 PROCEDURE — 258N000003 HC RX IP 258 OP 636: Performed by: INTERNAL MEDICINE

## 2020-11-07 PROCEDURE — 120N000001 HC R&B MED SURG/OB

## 2020-11-07 PROCEDURE — 250N000011 HC RX IP 250 OP 636: Performed by: INTERNAL MEDICINE

## 2020-11-07 RX ADMIN — GABAPENTIN 600 MG: 600 TABLET, FILM COATED ORAL at 14:58

## 2020-11-07 RX ADMIN — FLUTICASONE FUROATE AND VILANTEROL TRIFENATATE 1 PUFF: 200; 25 POWDER RESPIRATORY (INHALATION) at 09:32

## 2020-11-07 RX ADMIN — TIZANIDINE 6 MG: 2 TABLET ORAL at 09:28

## 2020-11-07 RX ADMIN — DULOXETINE HYDROCHLORIDE 40 MG: 20 CAPSULE, DELAYED RELEASE ORAL at 19:35

## 2020-11-07 RX ADMIN — ALBUTEROL SULFATE 2 PUFF: 90 AEROSOL, METERED RESPIRATORY (INHALATION) at 19:49

## 2020-11-07 RX ADMIN — HYDROCODONE BITARTRATE AND ACETAMINOPHEN 1 TABLET: 5; 325 TABLET ORAL at 14:33

## 2020-11-07 RX ADMIN — ALBUTEROL SULFATE 2 PUFF: 90 AEROSOL, METERED RESPIRATORY (INHALATION) at 12:54

## 2020-11-07 RX ADMIN — TIZANIDINE 6 MG: 2 TABLET ORAL at 19:35

## 2020-11-07 RX ADMIN — FUROSEMIDE 20 MG: 20 TABLET ORAL at 09:28

## 2020-11-07 RX ADMIN — PANTOPRAZOLE SODIUM 40 MG: 40 TABLET, DELAYED RELEASE ORAL at 09:28

## 2020-11-07 RX ADMIN — GABAPENTIN 600 MG: 600 TABLET, FILM COATED ORAL at 09:29

## 2020-11-07 RX ADMIN — HYDROCODONE BITARTRATE AND ACETAMINOPHEN 1 TABLET: 5; 325 TABLET ORAL at 23:55

## 2020-11-07 RX ADMIN — SODIUM CHLORIDE, POTASSIUM CHLORIDE, SODIUM LACTATE AND CALCIUM CHLORIDE: 600; 310; 30; 20 INJECTION, SOLUTION INTRAVENOUS at 06:39

## 2020-11-07 RX ADMIN — QUETIAPINE 25 MG: 25 TABLET ORAL at 14:58

## 2020-11-07 RX ADMIN — QUETIAPINE 25 MG: 25 TABLET ORAL at 09:29

## 2020-11-07 RX ADMIN — HYDROCODONE BITARTRATE AND ACETAMINOPHEN 1 TABLET: 5; 325 TABLET ORAL at 19:35

## 2020-11-07 RX ADMIN — TIZANIDINE 6 MG: 2 TABLET ORAL at 14:58

## 2020-11-07 RX ADMIN — QUETIAPINE 25 MG: 25 TABLET ORAL at 19:35

## 2020-11-07 RX ADMIN — DICLOFENAC SODIUM 2 G: 10 GEL TOPICAL at 09:32

## 2020-11-07 RX ADMIN — ALBUTEROL SULFATE 2 PUFF: 90 AEROSOL, METERED RESPIRATORY (INHALATION) at 09:32

## 2020-11-07 RX ADMIN — HYDROCODONE BITARTRATE AND ACETAMINOPHEN 1 TABLET: 5; 325 TABLET ORAL at 05:52

## 2020-11-07 RX ADMIN — APIXABAN 5 MG: 5 TABLET, FILM COATED ORAL at 19:35

## 2020-11-07 RX ADMIN — MONTELUKAST 10 MG: 10 TABLET, FILM COATED ORAL at 22:23

## 2020-11-07 RX ADMIN — DEXAMETHASONE 4 MG: 4 TABLET ORAL at 09:29

## 2020-11-07 RX ADMIN — DICLOFENAC SODIUM 2 G: 10 GEL TOPICAL at 19:49

## 2020-11-07 RX ADMIN — AZITHROMYCIN MONOHYDRATE 250 MG: 250 TABLET ORAL at 09:28

## 2020-11-07 RX ADMIN — APIXABAN 5 MG: 5 TABLET, FILM COATED ORAL at 09:29

## 2020-11-07 RX ADMIN — ATORVASTATIN CALCIUM 40 MG: 40 TABLET, FILM COATED ORAL at 22:23

## 2020-11-07 RX ADMIN — GABAPENTIN 900 MG: 300 CAPSULE ORAL at 22:23

## 2020-11-07 RX ADMIN — HYDROCODONE BITARTRATE AND ACETAMINOPHEN 1 TABLET: 5; 325 TABLET ORAL at 10:24

## 2020-11-07 RX ADMIN — CEFTRIAXONE SODIUM 2 G: 2 INJECTION, POWDER, FOR SOLUTION INTRAMUSCULAR; INTRAVENOUS at 05:52

## 2020-11-07 ASSESSMENT — ACTIVITIES OF DAILY LIVING (ADL)
ADLS_ACUITY_SCORE: 22
ADLS_ACUITY_SCORE: 18

## 2020-11-07 NOTE — PLAN OF CARE
A/O x4. VSS. Norco x2 for pain. Denies N/V. Regular diet. Micro turns/pillows adjusted as tolerated. Purewick in place, voiding adequate amounts of urine. Incontinent of stool, no BM overnight. Mepilex in place to coccyx per plan of care. Plan pending.

## 2020-11-07 NOTE — CONSULTS
Care Management Follow Up    Length of Stay (days): 2    Expected Discharge Date: 11/09/20     Concerns to be Addressed: discharge planning;other (see comments)(The pt has a non healing deep Coccyx wound that is painful and prevents her from having her head lifted more than 30 degrees to eat.  She only tolerates being up in the chair for 10 minutes and will only get up when he son visis her.)  The pt has refused wound vacs in the past.  Patient plan of care discussed at interdisciplinary rounds:    Anticipated Discharge Disposition: Long Term Care     Anticipated Discharge Services: Transportation Services  Anticipated Discharge DME: Oxygen(PT is on O2 at baseline 3-4 L)      Additional Information:  Per chart review, CC RN,. Madisyn, had spoken to Jarek of [facility] staff who confirmed that patient could return but would need to be back her baseline O2 3-4 L. Per Madisyn's note, [facility] prefers weekday admissions and will take admission at any time of day with preference to early evening at the latest.  SW will need to call admissions office to notify them of the pt's return, [phone], and fax orders to [phone].      MARY Granado

## 2020-11-07 NOTE — PROGRESS NOTES
Pt unable to tolerate HFNC, trialed on oxymizer cannula 12 lpm. SpO2 98%, tolerating well. RN and oncoming RT updated.     Bryant Thurman, RT

## 2020-11-07 NOTE — PLAN OF CARE
Transferred safely from OBS unit this afternoon. A/ox4, forgetful at times. Up with lift. Hemiparesis on L side at baseline. Refuses repositioning. VSS, hypertensive w/in parameters. Sats low 90's on HFNC. On pulsate mattress. Coccyx dressing CDI. C/o coccyx pain,decreased with norco prn. Purewick in place for urinary incontinence. LS diminished, occasional productive cough. SW following for discharge planning.

## 2020-11-07 NOTE — PROGRESS NOTES
[facility]    Hospitalist Progress Note    Assessment & Plan   65 year old female who was admitted on 11/4/2020 with bilateral pneumonia and respiratory failure, Covid 19 negative, but high suspicion:      Impression:   Principal Problem:    Bilateral Pneumonia, Right and Left Lower Lobes -- appears bacterial    -- continue antibiotics D4 today with rocephin and azithromycin      Acute Resp failure with hypoxia    -- improving on 6>3L since earlier this morning, tells me her chronic is 2L   -- continue supplemental O2 and wean down as able      Large chronic Sacral decub ulcer -- has declined definitive surgical therapy   -- continue wound care     Active Problems:    COVID-19 negative x 2   -- transfer to medicine floor      History of stroke w residual Left hemiplegia      Chronic obstructive pulmonary disease    -no wheezing on exam, continue tx with antibiotics and 5 day steroid burst   -on home inhaler with PRN albuterol    Sacral Ulcer POA  -stage IV, care per nursing with frequent turns and mepilex  -WOC consulted but will not likely see until after the weekend     Plan:  Continue antibiotics, will wean off Decadron. Likely discharge back to her nursing facility Monday if remains clinically stable.    DVT Prophylaxis: Loveox  Code Status: No CPR- Do NOT Intubate    Disposition: Expected discharge in 2 days    Pinky Colby MD      Interval History   Believes her breathing is about baseline. No major cough. Pain in her L hip which is her chronic wound pain. Complains of ear wax and difficulty hearing in her L ear. No issues eating.      Physical Exam   Temp: 96.4  F (35.8  C) Temp src: Oral BP: (!) 166/84 Pulse: 81   Resp: 18 SpO2: 90 % O2 Device: Nasal cannula Oxygen Delivery: 3 LPM  Vitals:    11/04/20 0432   Weight: 105.2 kg (232 lb)     Vital Signs with Ranges  Temp:  [96.4  F (35.8  C)-98  F (36.7  C)] 96.4  F (35.8  C)  Pulse:  [70-92] 81  Resp:  [14-20] 18  BP:  (137-166)/(71-87) 166/84  FiO2 (%):  [35 %-45 %] 35 %  SpO2:  [90 %-100 %] 90 %  I/O last 3 completed shifts:  In: 840 [P.O.:840]  Out: 2825 [Urine:2825]    BP (!) 166/84 (BP Location: Right arm)   Pulse 81   Temp 96.4  F (35.8  C) (Oral)   Resp 18   Wt 105.2 kg (232 lb)   SpO2 90%    GENERAL:  Alert, in no distress   PULMONARY:  Clear to auscultation. No wheezing  CARDIOVASCULAR:  S1, S2, regular rate and rhythm.   GASTROINTESTINAL:  Abdomen is obese.  Soft, nontender, normal bowel sounds.   MUSCULOSKELETAL:  No edema.   NEUROLOGIC:  Alert, Ox2.5, left hemiplegia, unable to sit up in bed 2nd to weakness  SKIN:  large sacral ulcer (per nursing)    Medications     lactated ringers 75 mL/hr at 11/07/20 0639     - MEDICATION INSTRUCTIONS -         albuterol  2 puff Inhalation 4x Daily     apixaban ANTICOAGULANT  5 mg Oral BID     atorvastatin  40 mg Oral At Bedtime     azithromycin  250 mg Oral Daily     cefTRIAXone  2 g Intravenous Q24H     dexamethasone  4 mg Oral Daily    Followed by     [START ON 11/9/2020] dexamethasone  2 mg Oral Daily     diclofenac  2 g Topical TID     DULoxetine  40 mg Oral QPM     fluticasone-vilanterol  1 puff Inhalation Daily     furosemide  20 mg Oral QAM     gabapentin  900 mg Oral At Bedtime     gabapentin  600 mg Oral BID     montelukast  10 mg Oral At Bedtime     pantoprazole  40 mg Oral Daily     polyethylene glycol  17 g Oral Daily     QUEtiapine  25 mg Oral TID     senna-docusate  1 tablet Oral BID    Or     senna-docusate  2 tablet Oral BID     sodium chloride (PF)  3 mL Intracatheter Q8H     tiZANidine  6 mg Oral TID       Data   Recent Labs   Lab 11/07/20  1053 11/06/20  0909 11/05/20  0717 11/04/20  1336 11/04/20  0442   WBC 14.5* 11.5* 10.9 17.0* 14.5*   HGB 9.8* 9.2* 9.5* 9.9* 10.4*   MCV 92 94 93 93 93   * 450 412 435 441   INR  --   --  1.25* 1.20*  --     141 141 139 137   POTASSIUM 3.5 3.5 3.6 4.2 3.5   CHLORIDE 102 105 105 103 100   CO2 31 32 33* 31 33*    BUN 13 18 15 17 21   CR 0.54 0.42* 0.40* 0.37* 0.53   ANIONGAP 6 4 3 5 4   JUVENCIO 8.8 9.3 9.5 9.3 9.2   * 120* 105* 145* 128*   ALBUMIN  --   --   --  2.1* 2.4*   PROTTOTAL  --   --   --  6.9 7.0   BILITOTAL  --   --   --  0.2 0.3   ALKPHOS  --   --   --  117 116   ALT  --   --   --  35 37   AST  --   --   --  17 16   TROPI  --   --  <0.015 <0.015 <0.015       Imaging:   No results found for this or any previous visit (from the past 24 hour(s)).

## 2020-11-08 LAB
ANION GAP SERPL CALCULATED.3IONS-SCNC: 5 MMOL/L (ref 3–14)
BUN SERPL-MCNC: 14 MG/DL (ref 7–30)
CALCIUM SERPL-MCNC: 9.4 MG/DL (ref 8.5–10.1)
CHLORIDE SERPL-SCNC: 102 MMOL/L (ref 94–109)
CO2 SERPL-SCNC: 30 MMOL/L (ref 20–32)
CREAT SERPL-MCNC: 0.45 MG/DL (ref 0.52–1.04)
GFR SERPL CREATININE-BSD FRML MDRD: >90 ML/MIN/{1.73_M2}
GLUCOSE SERPL-MCNC: 100 MG/DL (ref 70–99)
POTASSIUM SERPL-SCNC: 3.6 MMOL/L (ref 3.4–5.3)
SODIUM SERPL-SCNC: 137 MMOL/L (ref 133–144)

## 2020-11-08 PROCEDURE — 250N000012 HC RX MED GY IP 250 OP 636 PS 637: Performed by: INTERNAL MEDICINE

## 2020-11-08 PROCEDURE — 36415 COLL VENOUS BLD VENIPUNCTURE: CPT | Performed by: STUDENT IN AN ORGANIZED HEALTH CARE EDUCATION/TRAINING PROGRAM

## 2020-11-08 PROCEDURE — 80048 BASIC METABOLIC PNL TOTAL CA: CPT | Performed by: STUDENT IN AN ORGANIZED HEALTH CARE EDUCATION/TRAINING PROGRAM

## 2020-11-08 PROCEDURE — 250N000013 HC RX MED GY IP 250 OP 250 PS 637: Performed by: STUDENT IN AN ORGANIZED HEALTH CARE EDUCATION/TRAINING PROGRAM

## 2020-11-08 PROCEDURE — 99207 PR CDG-MDM COMPONENT: MEETS MODERATE - UP CODED: CPT | Performed by: STUDENT IN AN ORGANIZED HEALTH CARE EDUCATION/TRAINING PROGRAM

## 2020-11-08 PROCEDURE — 120N000001 HC R&B MED SURG/OB

## 2020-11-08 PROCEDURE — 999N000040 HC STATISTIC CONSULT NO CHARGE VASC ACCESS

## 2020-11-08 PROCEDURE — 258N000003 HC RX IP 258 OP 636: Performed by: INTERNAL MEDICINE

## 2020-11-08 PROCEDURE — 250N000013 HC RX MED GY IP 250 OP 250 PS 637: Performed by: INTERNAL MEDICINE

## 2020-11-08 PROCEDURE — 99232 SBSQ HOSP IP/OBS MODERATE 35: CPT | Performed by: STUDENT IN AN ORGANIZED HEALTH CARE EDUCATION/TRAINING PROGRAM

## 2020-11-08 PROCEDURE — 250N000011 HC RX IP 250 OP 636: Performed by: INTERNAL MEDICINE

## 2020-11-08 RX ORDER — CEFDINIR 300 MG/1
300 CAPSULE ORAL EVERY 12 HOURS SCHEDULED
Status: DISCONTINUED | OUTPATIENT
Start: 2020-11-08 | End: 2020-11-08

## 2020-11-08 RX ORDER — CEFDINIR 300 MG/1
300 CAPSULE ORAL EVERY 12 HOURS SCHEDULED
Status: DISCONTINUED | OUTPATIENT
Start: 2020-11-08 | End: 2020-11-10 | Stop reason: HOSPADM

## 2020-11-08 RX ORDER — FLUTICASONE PROPIONATE 50 MCG
1 SPRAY, SUSPENSION (ML) NASAL DAILY
Status: DISCONTINUED | OUTPATIENT
Start: 2020-11-08 | End: 2020-11-10 | Stop reason: HOSPADM

## 2020-11-08 RX ORDER — HYDRALAZINE HYDROCHLORIDE 20 MG/ML
10 INJECTION INTRAMUSCULAR; INTRAVENOUS EVERY 4 HOURS PRN
Status: DISCONTINUED | OUTPATIENT
Start: 2020-11-08 | End: 2020-11-10 | Stop reason: HOSPADM

## 2020-11-08 RX ORDER — CEFPODOXIME PROXETIL 200 MG/1
200 TABLET, FILM COATED ORAL EVERY 12 HOURS SCHEDULED
Status: DISCONTINUED | OUTPATIENT
Start: 2020-11-08 | End: 2020-11-08 | Stop reason: CLARIF

## 2020-11-08 RX ADMIN — ALBUTEROL SULFATE 2 PUFF: 90 AEROSOL, METERED RESPIRATORY (INHALATION) at 16:07

## 2020-11-08 RX ADMIN — TIZANIDINE 6 MG: 2 TABLET ORAL at 14:27

## 2020-11-08 RX ADMIN — DOCUSATE SODIUM 50 MG AND SENNOSIDES 8.6 MG 2 TABLET: 8.6; 5 TABLET, FILM COATED ORAL at 09:10

## 2020-11-08 RX ADMIN — HYDROCODONE BITARTRATE AND ACETAMINOPHEN 1 TABLET: 5; 325 TABLET ORAL at 21:21

## 2020-11-08 RX ADMIN — DULOXETINE HYDROCHLORIDE 40 MG: 20 CAPSULE, DELAYED RELEASE ORAL at 20:44

## 2020-11-08 RX ADMIN — CEFDINIR 300 MG: 300 CAPSULE ORAL at 20:45

## 2020-11-08 RX ADMIN — CEFDINIR 300 MG: 300 CAPSULE ORAL at 16:06

## 2020-11-08 RX ADMIN — APIXABAN 5 MG: 5 TABLET, FILM COATED ORAL at 20:44

## 2020-11-08 RX ADMIN — TIZANIDINE 6 MG: 2 TABLET ORAL at 09:10

## 2020-11-08 RX ADMIN — AZITHROMYCIN MONOHYDRATE 250 MG: 250 TABLET ORAL at 09:09

## 2020-11-08 RX ADMIN — ALBUTEROL SULFATE 2 PUFF: 90 AEROSOL, METERED RESPIRATORY (INHALATION) at 20:43

## 2020-11-08 RX ADMIN — ALBUTEROL SULFATE 2 PUFF: 90 AEROSOL, METERED RESPIRATORY (INHALATION) at 12:09

## 2020-11-08 RX ADMIN — FLUTICASONE FUROATE AND VILANTEROL TRIFENATATE 1 PUFF: 200; 25 POWDER RESPIRATORY (INHALATION) at 09:18

## 2020-11-08 RX ADMIN — POLYETHYLENE GLYCOL 3350 17 G: 17 POWDER, FOR SOLUTION ORAL at 08:17

## 2020-11-08 RX ADMIN — QUETIAPINE 25 MG: 25 TABLET ORAL at 14:27

## 2020-11-08 RX ADMIN — ALBUTEROL SULFATE 2 PUFF: 90 AEROSOL, METERED RESPIRATORY (INHALATION) at 09:18

## 2020-11-08 RX ADMIN — GABAPENTIN 600 MG: 600 TABLET, FILM COATED ORAL at 09:10

## 2020-11-08 RX ADMIN — TIZANIDINE 6 MG: 2 TABLET ORAL at 20:44

## 2020-11-08 RX ADMIN — DICLOFENAC SODIUM 2 G: 10 GEL TOPICAL at 09:20

## 2020-11-08 RX ADMIN — QUETIAPINE 25 MG: 25 TABLET ORAL at 09:10

## 2020-11-08 RX ADMIN — FLUTICASONE PROPIONATE 1 SPRAY: 50 SPRAY, METERED NASAL at 12:08

## 2020-11-08 RX ADMIN — APIXABAN 5 MG: 5 TABLET, FILM COATED ORAL at 09:09

## 2020-11-08 RX ADMIN — ATORVASTATIN CALCIUM 40 MG: 40 TABLET, FILM COATED ORAL at 21:21

## 2020-11-08 RX ADMIN — PANTOPRAZOLE SODIUM 40 MG: 40 TABLET, DELAYED RELEASE ORAL at 09:09

## 2020-11-08 RX ADMIN — DEXAMETHASONE 4 MG: 4 TABLET ORAL at 09:10

## 2020-11-08 RX ADMIN — GABAPENTIN 600 MG: 600 TABLET, FILM COATED ORAL at 14:28

## 2020-11-08 RX ADMIN — HYDROCODONE BITARTRATE AND ACETAMINOPHEN 1 TABLET: 5; 325 TABLET ORAL at 09:09

## 2020-11-08 RX ADMIN — QUETIAPINE 25 MG: 25 TABLET ORAL at 20:44

## 2020-11-08 RX ADMIN — GABAPENTIN 900 MG: 300 CAPSULE ORAL at 21:21

## 2020-11-08 RX ADMIN — FUROSEMIDE 20 MG: 20 TABLET ORAL at 09:09

## 2020-11-08 RX ADMIN — ACETAMINOPHEN 650 MG: 325 TABLET, FILM COATED ORAL at 20:43

## 2020-11-08 RX ADMIN — CEFTRIAXONE SODIUM 2 G: 2 INJECTION, POWDER, FOR SOLUTION INTRAMUSCULAR; INTRAVENOUS at 06:18

## 2020-11-08 RX ADMIN — SODIUM CHLORIDE, POTASSIUM CHLORIDE, SODIUM LACTATE AND CALCIUM CHLORIDE: 600; 310; 30; 20 INJECTION, SOLUTION INTRAVENOUS at 00:24

## 2020-11-08 RX ADMIN — DICLOFENAC SODIUM 2 G: 10 GEL TOPICAL at 20:43

## 2020-11-08 RX ADMIN — HYDROCODONE BITARTRATE AND ACETAMINOPHEN 1 TABLET: 5; 325 TABLET ORAL at 12:37

## 2020-11-08 RX ADMIN — MONTELUKAST 10 MG: 10 TABLET, FILM COATED ORAL at 21:21

## 2020-11-08 RX ADMIN — DICLOFENAC SODIUM 2 G: 10 GEL TOPICAL at 14:28

## 2020-11-08 RX ADMIN — HYDROCODONE BITARTRATE AND ACETAMINOPHEN 1 TABLET: 5; 325 TABLET ORAL at 17:13

## 2020-11-08 ASSESSMENT — ACTIVITIES OF DAILY LIVING (ADL)
ADLS_ACUITY_SCORE: 22
ADLS_ACUITY_SCORE: 18
ADLS_ACUITY_SCORE: 18
ADLS_ACUITY_SCORE: 20
ADLS_ACUITY_SCORE: 18
ADLS_ACUITY_SCORE: 18

## 2020-11-08 NOTE — PROVIDER NOTIFICATION
Notified MD at 1150 AM regarding Pt missed today dose of IV antibiotic due to loss of IV access. Pt is a difficult stick..      Spoke with: Paged dr. Colby.    Orders were not obtained.    Comments: Waiting for a call back.

## 2020-11-08 NOTE — PROGRESS NOTES
[facility]    Hospitalist Progress Note    Assessment & Plan   65 year old female who was admitted on 11/4/2020 with bilateral pneumonia and respiratory failure, Covid 19 negative, but high suspicion:      Impression:   Principal Problem:    Bilateral Pneumonia, Right and Left Lower Lobes -- appears bacterial    -- continue antibiotics D5 today with rocephin and azithromycin, may need to change rocephin to oral options due to IV issues but will wait to see if they can get one replaced      Acute Resp failure with hypoxia -resolving   -- remains on 3L however patient tells me her nose is stuffed and she is only breathing out of her mouth, will try nasal spray and put on mask and follow O2 needs   --per patient home o2 needs are 2L however SW note says 3-4L      Large chronic Sacral decub ulcer POA -- has declined definitive surgical therapy, stage IV   -- continue wound care  And turns, WOC consulted   --complains of pain however refuses most turns, PRN pain meds    Active Problems:    COVID-19 negative x 2   -- transfer to medicine floor      History of stroke w residual Left hemiplegia      Chronic obstructive pulmonary disease    -no wheezing on exam, continue tx with antibiotics and 5 day steroid burst   -on home inhaler with PRN albuterol    Elevated BP  -no essential HTN in chart  -BP elevated last 24 hours likely steroid induced  -will add on PRN for now    Plan:  Continue antibiotics, will wean off Decadron. Taper already in place. Likely discharge back to her nursing facility Monday if remains clinically stable.    DVT Prophylaxis: Loveox  Code Status: No CPR- Do NOT Intubate    Disposition: Expected discharge tomorrow if remains clinically stable.    Pinky Colby MD      Interval History    Tells me her neuropathy pain is worse this morning with any movement. Denies issues breathing but does have a stuffy nose which makes her mouth breath. No issues eating. No cough. Pain  remains present at her ulcer, treated with PRN pain medications. Seems a bit anxious about everything.    Physical Exam   Temp: 96.8  F (36  C) Temp src: Oral BP: (!) 183/89(RN notify) Pulse: 79   Resp: 16 SpO2: 90 % O2 Device: Nasal cannula Oxygen Delivery: 3 LPM  Vitals:    11/04/20 0432   Weight: 105.2 kg (232 lb)     Vital Signs with Ranges  Temp:  [96.7  F (35.9  C)-97.7  F (36.5  C)] 96.8  F (36  C)  Pulse:  [72-81] 79  Resp:  [16-18] 16  BP: (158-183)/(82-89) 183/89  SpO2:  [90 %-94 %] 90 %  I/O last 3 completed shifts:  In: 1433 [P.O.:360; I.V.:1073]  Out: 3100 [Urine:3100]    BP (!) 183/89 (BP Location: Right arm)   Pulse 79   Temp 96.8  F (36  C) (Oral)   Resp 16   Wt 105.2 kg (232 lb)   SpO2 90%    GENERAL:  Alert, in no distress, anxious   PULMONARY:  Clear to auscultation. No wheezing  CARDIOVASCULAR:  S1, S2, regular rate and rhythm.   GASTROINTESTINAL:  Abdomen is obese.  Soft, nontender, normal bowel sounds.   MUSCULOSKELETAL:  No edema.   NEUROLOGIC:  Alert, left hemiplegia, unable to sit up in bed 2nd to weakness  SKIN:  large sacral ulcer (per nursing)    Medications     lactated ringers 75 mL/hr at 11/08/20 0024     - MEDICATION INSTRUCTIONS -         albuterol  2 puff Inhalation 4x Daily     apixaban ANTICOAGULANT  5 mg Oral BID     atorvastatin  40 mg Oral At Bedtime     cefTRIAXone  2 g Intravenous Q24H     [START ON 11/9/2020] dexamethasone  2 mg Oral Daily     diclofenac  2 g Topical TID     DULoxetine  40 mg Oral QPM     fluticasone  1 spray Both Nostrils Daily     fluticasone-vilanterol  1 puff Inhalation Daily     furosemide  20 mg Oral QAM     gabapentin  900 mg Oral At Bedtime     gabapentin  600 mg Oral BID     montelukast  10 mg Oral At Bedtime     pantoprazole  40 mg Oral Daily     polyethylene glycol  17 g Oral Daily     QUEtiapine  25 mg Oral TID     senna-docusate  1 tablet Oral BID    Or     senna-docusate  2 tablet Oral BID     sodium chloride (PF)  3 mL Intracatheter Q8H      tiZANidine  6 mg Oral TID       Data   Recent Labs   Lab 11/08/20  0708 11/07/20  1053 11/06/20  0909 11/05/20  0717 11/04/20  1336 11/04/20  0442   WBC  --  14.5* 11.5* 10.9 17.0* 14.5*   HGB  --  9.8* 9.2* 9.5* 9.9* 10.4*   MCV  --  92 94 93 93 93   PLT  --  454* 450 412 435 441   INR  --   --   --  1.25* 1.20*  --     139 141 141 139 137   POTASSIUM 3.6 3.5 3.5 3.6 4.2 3.5   CHLORIDE 102 102 105 105 103 100   CO2 30 31 32 33* 31 33*   BUN 14 13 18 15 17 21   CR 0.45* 0.54 0.42* 0.40* 0.37* 0.53   ANIONGAP 5 6 4 3 5 4   JUVENCIO 9.4 8.8 9.3 9.5 9.3 9.2   * 115* 120* 105* 145* 128*   ALBUMIN  --   --   --   --  2.1* 2.4*   PROTTOTAL  --   --   --   --  6.9 7.0   BILITOTAL  --   --   --   --  0.2 0.3   ALKPHOS  --   --   --   --  117 116   ALT  --   --   --   --  35 37   AST  --   --   --   --  17 16   TROPI  --   --   --  <0.015 <0.015 <0.015       Imaging:   No results found for this or any previous visit (from the past 24 hour(s)).

## 2020-11-08 NOTE — PLAN OF CARE
Alert and oriented, BP elevated at times with PRN, incontinent x 2, 1 large BM during the shift, regular diet, wound dressing changed x 2 during the shift, no IV access MD knows, shifted to oral antibiotics. For discharge back to nursing facility once stable.

## 2020-11-08 NOTE — PLAN OF CARE
A&Ox4. VSS on 2L. PRN Norco given for pain; effective. Tolerating regular diet. Denies N/V. T&Rq2h; Micro turns/pillows adjusted as tolerated. Purewick in place; adquate UOP. Mepilex in place to coccyx per plan of care. IV access lost at 0630, new IV needs to be placed. Plan pending.    Addendum: 6 AM Rocephin not given due to infiltration.

## 2020-11-08 NOTE — PLAN OF CARE
A/ox4, forgetful. Up with lift. T/R as pt allows. Regular diet. PRN norco for coccyx pain. VSS, hypertensive w/in parameters, Sats low 90's on 2L O2 via Oxymizer. LS diminished. Occasional productive cough. Purewick in place. Incontinent of loose stool, held bowel meds. Wound care per WOC orders. Coccyx mepilex CDI. +2 LUE edema. Baseline 1/5 strength L side d/t prior CVA. Plan is back to LTC when medically ready.

## 2020-11-08 NOTE — PLAN OF CARE
A&Ox4. VSS on 3L. PRN Norco given for pain; effective. Tolerating regular diet. T&Rq2h; Micro turns/pillows adjusted as tolerated. Purewick in place; adquate UOP. Mepilex in place to coccyx per plan of care. Plan pending.

## 2020-11-09 LAB
ANION GAP SERPL CALCULATED.3IONS-SCNC: 4 MMOL/L (ref 3–14)
BASOPHILS # BLD AUTO: 0 10E9/L (ref 0–0.2)
BASOPHILS NFR BLD AUTO: 0.1 %
BUN SERPL-MCNC: 21 MG/DL (ref 7–30)
CALCIUM SERPL-MCNC: 8.9 MG/DL (ref 8.5–10.1)
CHLORIDE SERPL-SCNC: 102 MMOL/L (ref 94–109)
CO2 SERPL-SCNC: 30 MMOL/L (ref 20–32)
CREAT SERPL-MCNC: 0.41 MG/DL (ref 0.52–1.04)
DIFFERENTIAL METHOD BLD: ABNORMAL
EOSINOPHIL # BLD AUTO: 0.3 10E9/L (ref 0–0.7)
EOSINOPHIL NFR BLD AUTO: 1.9 %
ERYTHROCYTE [DISTWIDTH] IN BLOOD BY AUTOMATED COUNT: 13.6 % (ref 10–15)
GFR SERPL CREATININE-BSD FRML MDRD: >90 ML/MIN/{1.73_M2}
GLUCOSE SERPL-MCNC: 94 MG/DL (ref 70–99)
HCT VFR BLD AUTO: 32.9 % (ref 35–47)
HGB BLD-MCNC: 10.5 G/DL (ref 11.7–15.7)
IMM GRANULOCYTES # BLD: 0.3 10E9/L (ref 0–0.4)
IMM GRANULOCYTES NFR BLD: 1.7 %
LYMPHOCYTES # BLD AUTO: 3 10E9/L (ref 0.8–5.3)
LYMPHOCYTES NFR BLD AUTO: 20.6 %
MCH RBC QN AUTO: 29.2 PG (ref 26.5–33)
MCHC RBC AUTO-ENTMCNC: 31.9 G/DL (ref 31.5–36.5)
MCV RBC AUTO: 91 FL (ref 78–100)
MONOCYTES # BLD AUTO: 0.8 10E9/L (ref 0–1.3)
MONOCYTES NFR BLD AUTO: 5.6 %
NEUTROPHILS # BLD AUTO: 10.1 10E9/L (ref 1.6–8.3)
NEUTROPHILS NFR BLD AUTO: 70.1 %
NRBC # BLD AUTO: 0 10*3/UL
NRBC BLD AUTO-RTO: 0 /100
PLATELET # BLD AUTO: 532 10E9/L (ref 150–450)
POTASSIUM SERPL-SCNC: 3.8 MMOL/L (ref 3.4–5.3)
RBC # BLD AUTO: 3.6 10E12/L (ref 3.8–5.2)
SODIUM SERPL-SCNC: 136 MMOL/L (ref 133–144)
WBC # BLD AUTO: 14.4 10E9/L (ref 4–11)

## 2020-11-09 PROCEDURE — 99207 PR CDG-MDM COMPONENT: MEETS MODERATE - UP CODED: CPT | Performed by: STUDENT IN AN ORGANIZED HEALTH CARE EDUCATION/TRAINING PROGRAM

## 2020-11-09 PROCEDURE — 80048 BASIC METABOLIC PNL TOTAL CA: CPT | Performed by: STUDENT IN AN ORGANIZED HEALTH CARE EDUCATION/TRAINING PROGRAM

## 2020-11-09 PROCEDURE — 85025 COMPLETE CBC W/AUTO DIFF WBC: CPT | Performed by: STUDENT IN AN ORGANIZED HEALTH CARE EDUCATION/TRAINING PROGRAM

## 2020-11-09 PROCEDURE — 120N000001 HC R&B MED SURG/OB

## 2020-11-09 PROCEDURE — 99232 SBSQ HOSP IP/OBS MODERATE 35: CPT | Performed by: STUDENT IN AN ORGANIZED HEALTH CARE EDUCATION/TRAINING PROGRAM

## 2020-11-09 PROCEDURE — 36415 COLL VENOUS BLD VENIPUNCTURE: CPT | Performed by: STUDENT IN AN ORGANIZED HEALTH CARE EDUCATION/TRAINING PROGRAM

## 2020-11-09 PROCEDURE — 250N000013 HC RX MED GY IP 250 OP 250 PS 637: Performed by: STUDENT IN AN ORGANIZED HEALTH CARE EDUCATION/TRAINING PROGRAM

## 2020-11-09 PROCEDURE — 250N000012 HC RX MED GY IP 250 OP 636 PS 637: Performed by: INTERNAL MEDICINE

## 2020-11-09 PROCEDURE — 250N000013 HC RX MED GY IP 250 OP 250 PS 637: Performed by: INTERNAL MEDICINE

## 2020-11-09 RX ADMIN — DOCUSATE SODIUM 50 MG AND SENNOSIDES 8.6 MG 1 TABLET: 8.6; 5 TABLET, FILM COATED ORAL at 21:01

## 2020-11-09 RX ADMIN — QUETIAPINE 25 MG: 25 TABLET ORAL at 09:11

## 2020-11-09 RX ADMIN — APIXABAN 5 MG: 5 TABLET, FILM COATED ORAL at 21:01

## 2020-11-09 RX ADMIN — ALBUTEROL SULFATE 2 PUFF: 90 AEROSOL, METERED RESPIRATORY (INHALATION) at 12:39

## 2020-11-09 RX ADMIN — DULOXETINE HYDROCHLORIDE 40 MG: 20 CAPSULE, DELAYED RELEASE ORAL at 21:01

## 2020-11-09 RX ADMIN — ALBUTEROL SULFATE 2 PUFF: 90 AEROSOL, METERED RESPIRATORY (INHALATION) at 09:16

## 2020-11-09 RX ADMIN — DICLOFENAC SODIUM 2 G: 10 GEL TOPICAL at 09:19

## 2020-11-09 RX ADMIN — ATORVASTATIN CALCIUM 40 MG: 40 TABLET, FILM COATED ORAL at 21:01

## 2020-11-09 RX ADMIN — TIZANIDINE 6 MG: 2 TABLET ORAL at 09:10

## 2020-11-09 RX ADMIN — POLYETHYLENE GLYCOL 3350 17 G: 17 POWDER, FOR SOLUTION ORAL at 09:04

## 2020-11-09 RX ADMIN — DICLOFENAC SODIUM 2 G: 10 GEL TOPICAL at 21:02

## 2020-11-09 RX ADMIN — CEFDINIR 300 MG: 300 CAPSULE ORAL at 09:13

## 2020-11-09 RX ADMIN — APIXABAN 5 MG: 5 TABLET, FILM COATED ORAL at 09:14

## 2020-11-09 RX ADMIN — HYDROCODONE BITARTRATE AND ACETAMINOPHEN 1 TABLET: 5; 325 TABLET ORAL at 12:52

## 2020-11-09 RX ADMIN — PANTOPRAZOLE SODIUM 40 MG: 40 TABLET, DELAYED RELEASE ORAL at 09:14

## 2020-11-09 RX ADMIN — HYDROCODONE BITARTRATE AND ACETAMINOPHEN 1 TABLET: 5; 325 TABLET ORAL at 05:17

## 2020-11-09 RX ADMIN — FLUTICASONE PROPIONATE 1 SPRAY: 50 SPRAY, METERED NASAL at 09:18

## 2020-11-09 RX ADMIN — TIZANIDINE 6 MG: 2 TABLET ORAL at 21:01

## 2020-11-09 RX ADMIN — HYDROCODONE BITARTRATE AND ACETAMINOPHEN 1 TABLET: 5; 325 TABLET ORAL at 16:51

## 2020-11-09 RX ADMIN — QUETIAPINE 25 MG: 25 TABLET ORAL at 14:08

## 2020-11-09 RX ADMIN — FLUTICASONE FUROATE AND VILANTEROL TRIFENATATE 1 PUFF: 200; 25 POWDER RESPIRATORY (INHALATION) at 09:16

## 2020-11-09 RX ADMIN — DICLOFENAC SODIUM 2 G: 10 GEL TOPICAL at 12:46

## 2020-11-09 RX ADMIN — HYDROCODONE BITARTRATE AND ACETAMINOPHEN 1 TABLET: 5; 325 TABLET ORAL at 21:01

## 2020-11-09 RX ADMIN — TIZANIDINE 6 MG: 2 TABLET ORAL at 14:08

## 2020-11-09 RX ADMIN — DOCUSATE SODIUM 50 MG AND SENNOSIDES 8.6 MG 1 TABLET: 8.6; 5 TABLET, FILM COATED ORAL at 09:09

## 2020-11-09 RX ADMIN — GABAPENTIN 600 MG: 600 TABLET, FILM COATED ORAL at 14:08

## 2020-11-09 RX ADMIN — QUETIAPINE 25 MG: 25 TABLET ORAL at 21:01

## 2020-11-09 RX ADMIN — MONTELUKAST 10 MG: 10 TABLET, FILM COATED ORAL at 21:01

## 2020-11-09 RX ADMIN — HYDROCODONE BITARTRATE AND ACETAMINOPHEN 1 TABLET: 5; 325 TABLET ORAL at 00:44

## 2020-11-09 RX ADMIN — FUROSEMIDE 20 MG: 20 TABLET ORAL at 09:12

## 2020-11-09 RX ADMIN — DEXAMETHASONE 2 MG: 2 TABLET ORAL at 09:13

## 2020-11-09 RX ADMIN — ALBUTEROL SULFATE 2 PUFF: 90 AEROSOL, METERED RESPIRATORY (INHALATION) at 21:02

## 2020-11-09 RX ADMIN — HYDROCODONE BITARTRATE AND ACETAMINOPHEN 1 TABLET: 5; 325 TABLET ORAL at 09:07

## 2020-11-09 RX ADMIN — CEFDINIR 300 MG: 300 CAPSULE ORAL at 21:01

## 2020-11-09 RX ADMIN — ALBUTEROL SULFATE 2 PUFF: 90 AEROSOL, METERED RESPIRATORY (INHALATION) at 16:51

## 2020-11-09 RX ADMIN — GABAPENTIN 600 MG: 600 TABLET, FILM COATED ORAL at 09:12

## 2020-11-09 RX ADMIN — GABAPENTIN 900 MG: 300 CAPSULE ORAL at 21:04

## 2020-11-09 ASSESSMENT — ACTIVITIES OF DAILY LIVING (ADL)
ADLS_ACUITY_SCORE: 20
ADLS_ACUITY_SCORE: 20
ADLS_ACUITY_SCORE: 22
ADLS_ACUITY_SCORE: 24
ADLS_ACUITY_SCORE: 20
ADLS_ACUITY_SCORE: 20

## 2020-11-09 NOTE — PLAN OF CARE
"7a-3p Nursing shift note  WBC elevation but MD's  Continues to have persistent extreme pain of her large (golf ball size) sacral.coccyx area pressure ulcer rated at 10 on -10 scale . Norco given x2 this shift and helps pain down to a tolerable 5 level. Writer asked MD to consider additional pain meds such as REGINA - MD said she'll think about it\". Lidoderm patches not feasible since pain is exactly where her ulcer is which requires daily and prn wound care. Wound care done/drsg changed at approx 12 noon today. Has persistent left side hemiplegia due to past stroke.   Tolerating regular diet. Pt may benefit from nystatin rinse; tongue looks like it may have thrush on it. T&R offered q2h but pt refuses to lie on left side. Only likes to ly in right side; Micro turns/pillows adjusted as tolerated. Purewick in place; adquate UOP. No IV access this no IV fluids infusing since  yesterday am; MD aware.   Plan to discharge back to [facility] pending status    Addendum O2 at 2LPM per nc to keep sats between 90 and 94%  "

## 2020-11-09 NOTE — PROGRESS NOTES
[facility]    Hospitalist Progress Note    Assessment & Plan   65 year old female who was admitted on 11/4/2020 with bilateral pneumonia and respiratory failure, Covid 19 negative, but high suspicion:      Impression:   Principal Problem:    Bilateral Pneumonia, Right and Left Lower Lobes  Severe Sepsis present on admission-resolved   -- will continue tx with antibiotics and steroid taper through tomorrow, rocephin changed to cefdinir due to patient pulling out IV yesterday      Acute Resp failure with hypoxia -resolving   --on 1-3L of O2 which is baseline, continue supplemental O2 and will finish tx with steroids and antibiotics tomorrow      Large chronic Sacral decub ulcer POA -- has declined definitive surgical therapy, stage IV   -- continue wound care  And turns, WOC consulted and following   --complains of pain however refuses most turns, PRN pain meds available no change for now   --will follow wound care assessment today to determine need for further work up, per son today she has been having weekly wound care visits at her nursing facility for her wound and it has been improving.    Active Problems:    COVID-19 negative x 2   -- transfer to medicine floor      History of stroke w residual Left hemiplegia      Chronic obstructive pulmonary disease    -no wheezing on exam   -continue care as above   -on home inhaler with PRN albuterol    Elevated BP   -no essential HTN in chart   -BP elevated last 24 hours likely steroid induced   -will add on PRN for now    Plan:  Discharge back to previous state of living tomorrow pending any further work up from wound care.     DVT Prophylaxis: Loveox  Code Status: No CPR- Do NOT Intubate    Disposition: Expected discharge tomorrow if remains clinically stable.    Pinky Colby MD      Interval History    Patient sleeping on assessment. Denies any major complaints. Still some pain in her wound. Breathing is stable no cough.     Physical Exam    Temp: 97.3  F (36.3  C) Temp src: Oral BP: (P) 131/69(taken BEFORE any meds) Pulse: 69   Resp: (P) 16 SpO2: (!) 89 % O2 Device: Oxymizer cannula Oxygen Delivery: 1 LPM  Vitals:    11/04/20 0432   Weight: 105.2 kg (232 lb)     Vital Signs with Ranges  Temp:  [96.9  F (36.1  C)-97.3  F (36.3  C)] 97.3  F (36.3  C)  Pulse:  [61-78] 69  Resp:  [16-18] (P) 16  BP: (158-183)/() (P) 131/69  SpO2:  [89 %-96 %] 89 %  I/O last 3 completed shifts:  In: -   Out: 1900 [Urine:1900]    BP (P) 131/69 (BP Location: Right arm)   Pulse 69   Temp 97.3  F (36.3  C) (Oral)   Resp (P) 16   Wt 105.2 kg (232 lb)   SpO2 (!) 89%    GENERAL:  Alert, in no distress, anxious   PULMONARY:  Clear to auscultation. No wheezing trace crackles in RLL  CARDIOVASCULAR:  S1, S2, regular rate and rhythm.   GASTROINTESTINAL:  Abdomen is obese.  Soft, nontender, normal bowel sounds.   MUSCULOSKELETAL:  No edema.   NEUROLOGIC:  Alert, left hemiplegia, unable to sit up in bed 2nd to weakness  SKIN:  large sacral ulcer (per nursing)    Medications     lactated ringers 75 mL/hr at 11/08/20 0024     - MEDICATION INSTRUCTIONS -         albuterol  2 puff Inhalation 4x Daily     apixaban ANTICOAGULANT  5 mg Oral BID     atorvastatin  40 mg Oral At Bedtime     cefdinir  300 mg Oral Q12H OBED     dexamethasone  2 mg Oral Daily     diclofenac  2 g Topical TID     DULoxetine  40 mg Oral QPM     fluticasone  1 spray Both Nostrils Daily     fluticasone-vilanterol  1 puff Inhalation Daily     furosemide  20 mg Oral QAM     gabapentin  900 mg Oral At Bedtime     gabapentin  600 mg Oral BID     montelukast  10 mg Oral At Bedtime     pantoprazole  40 mg Oral Daily     polyethylene glycol  17 g Oral Daily     QUEtiapine  25 mg Oral TID     senna-docusate  1 tablet Oral BID    Or     senna-docusate  2 tablet Oral BID     sodium chloride (PF)  3 mL Intracatheter Q8H     tiZANidine  6 mg Oral TID       Data   Recent Labs   Lab 11/09/20  0743 11/08/20  0708  11/07/20  1053 11/06/20  0909 11/05/20  0717 11/04/20  1336 11/04/20  0442   WBC 14.4*  --  14.5* 11.5* 10.9 17.0* 14.5*   HGB 10.5*  --  9.8* 9.2* 9.5* 9.9* 10.4*   MCV 91  --  92 94 93 93 93   *  --  454* 450 412 435 441   INR  --   --   --   --  1.25* 1.20*  --     137 139 141 141 139 137   POTASSIUM 3.8 3.6 3.5 3.5 3.6 4.2 3.5   CHLORIDE 102 102 102 105 105 103 100   CO2 30 30 31 32 33* 31 33*   BUN 21 14 13 18 15 17 21   CR 0.41* 0.45* 0.54 0.42* 0.40* 0.37* 0.53   ANIONGAP 4 5 6 4 3 5 4   JUVENCIO 8.9 9.4 8.8 9.3 9.5 9.3 9.2   GLC 94 100* 115* 120* 105* 145* 128*   ALBUMIN  --   --   --   --   --  2.1* 2.4*   PROTTOTAL  --   --   --   --   --  6.9 7.0   BILITOTAL  --   --   --   --   --  0.2 0.3   ALKPHOS  --   --   --   --   --  117 116   ALT  --   --   --   --   --  35 37   AST  --   --   --   --   --  17 16   TROPI  --   --   --   --  <0.015 <0.015 <0.015       Imaging:   No results found for this or any previous visit (from the past 24 hour(s)).

## 2020-11-09 NOTE — PLAN OF CARE
A&Ox4. VSS on 2L. PRN Norco given for pain; effective. Tolerating regular diet. Pt may benefit from nystatin rinse; tongue looks like it may have thrush on it. T&Rq2h; Micro turns/pillows adjusted as tolerated. Purewick in place; adquate UOP. Mepilex in place to coccyx; CDI. No IV access; MD aware. Plan to discharge back to [facility] tomorrow.

## 2020-11-09 NOTE — PROGRESS NOTES
A&O. VSS on 1L oxymizer. LS diminished. Up with a lift. Turn and reposition q2h, refuses at times, only likes to be positioned on left side, encouraged and educated about repositioning on both sides. Hemiparesis to left side. Incontinent, purewick in place. Coccyx PI, mepliex C/D/I. Edema to LUE. C/O left sided, and coccyx pain given PRN norco, effective. No IV access. Plan to discharge back to LTC today.

## 2020-11-09 NOTE — PROGRESS NOTES
Care Management Discharge Note    Discharge Date: 11/10/20([facility])       Discharge Disposition: Long Term Care    Discharge Services: Transportation Services    Discharge DME: Oxygen(PT is on O2 at baseline 3-4 L)    Discharge Transportation: (Medical transportation)    Private pay costs discussed: transportation costs Discussed with patient and son Mauri that we submit for medical reason why Stretcher is needed, but unable to guarantee insurance coverage.  Patient and son in understanding.     PAS Confirmation Code:    Patient/family educated on Medicare website which has current facility and service quality ratings:      Education Provided on the Discharge Plan:  Yes  Persons Notified of Discharge Plans: Patient and son   Patient/Family in Agreement with the Plan:  Yes    Handoff Referral Completed: No    Additional Information:  Per physician, patient likely ready for discharge on 11/10 in the morning.  SW placed call to [facility] for a stretcher ride secondary to left sided hemiplegia, hx of stroke, and coccyx wound which patient is unable to tolerate sitting on for more than 10 minutes. Ride arranged for 10:30.  Updated patient, son and facility.  All in agreement with discharge plan.         LISS Turner

## 2020-11-10 VITALS
SYSTOLIC BLOOD PRESSURE: 125 MMHG | TEMPERATURE: 97.8 F | RESPIRATION RATE: 14 BRPM | HEART RATE: 68 BPM | OXYGEN SATURATION: 92 % | WEIGHT: 232 LBS | DIASTOLIC BLOOD PRESSURE: 70 MMHG

## 2020-11-10 LAB
BACTERIA SPEC CULT: NO GROWTH
BACTERIA SPEC CULT: NO GROWTH
SPECIMEN SOURCE: NORMAL
SPECIMEN SOURCE: NORMAL

## 2020-11-10 PROCEDURE — 250N000013 HC RX MED GY IP 250 OP 250 PS 637: Performed by: INTERNAL MEDICINE

## 2020-11-10 PROCEDURE — 99239 HOSP IP/OBS DSCHRG MGMT >30: CPT | Performed by: STUDENT IN AN ORGANIZED HEALTH CARE EDUCATION/TRAINING PROGRAM

## 2020-11-10 PROCEDURE — 250N000013 HC RX MED GY IP 250 OP 250 PS 637: Performed by: STUDENT IN AN ORGANIZED HEALTH CARE EDUCATION/TRAINING PROGRAM

## 2020-11-10 PROCEDURE — 250N000012 HC RX MED GY IP 250 OP 636 PS 637: Performed by: INTERNAL MEDICINE

## 2020-11-10 RX ORDER — LIDOCAINE HYDROCHLORIDE 40 MG/ML
SOLUTION TOPICAL DAILY PRN
Qty: 50 ML | Refills: 0 | Status: SHIPPED | OUTPATIENT
Start: 2020-11-10 | End: 2020-11-15

## 2020-11-10 RX ORDER — HYDROCODONE BITARTRATE AND ACETAMINOPHEN 5; 325 MG/1; MG/1
1 TABLET ORAL 2 TIMES DAILY PRN
Qty: 15 TABLET | Refills: 0 | Status: SHIPPED | OUTPATIENT
Start: 2020-11-10

## 2020-11-10 RX ORDER — HYDROCODONE BITARTRATE AND ACETAMINOPHEN 5; 325 MG/1; MG/1
1 TABLET ORAL 2 TIMES DAILY
Qty: 15 TABLET | Refills: 0 | Status: SHIPPED | OUTPATIENT
Start: 2020-11-10

## 2020-11-10 RX ADMIN — GABAPENTIN 600 MG: 600 TABLET, FILM COATED ORAL at 09:14

## 2020-11-10 RX ADMIN — DICLOFENAC SODIUM 2 G: 10 GEL TOPICAL at 09:23

## 2020-11-10 RX ADMIN — APIXABAN 5 MG: 5 TABLET, FILM COATED ORAL at 09:14

## 2020-11-10 RX ADMIN — HYDROCODONE BITARTRATE AND ACETAMINOPHEN 1 TABLET: 5; 325 TABLET ORAL at 05:30

## 2020-11-10 RX ADMIN — FLUTICASONE FUROATE AND VILANTEROL TRIFENATATE 1 PUFF: 200; 25 POWDER RESPIRATORY (INHALATION) at 09:23

## 2020-11-10 RX ADMIN — FLUTICASONE PROPIONATE 1 SPRAY: 50 SPRAY, METERED NASAL at 09:23

## 2020-11-10 RX ADMIN — DEXAMETHASONE 2 MG: 2 TABLET ORAL at 09:14

## 2020-11-10 RX ADMIN — QUETIAPINE 25 MG: 25 TABLET ORAL at 05:30

## 2020-11-10 RX ADMIN — ALBUTEROL SULFATE 2 PUFF: 90 AEROSOL, METERED RESPIRATORY (INHALATION) at 09:23

## 2020-11-10 RX ADMIN — FUROSEMIDE 20 MG: 20 TABLET ORAL at 09:14

## 2020-11-10 RX ADMIN — PANTOPRAZOLE SODIUM 40 MG: 40 TABLET, DELAYED RELEASE ORAL at 05:30

## 2020-11-10 RX ADMIN — CEFDINIR 300 MG: 300 CAPSULE ORAL at 09:14

## 2020-11-10 RX ADMIN — TIZANIDINE 6 MG: 2 TABLET ORAL at 05:30

## 2020-11-10 RX ADMIN — POLYETHYLENE GLYCOL 3350 17 G: 17 POWDER, FOR SOLUTION ORAL at 09:14

## 2020-11-10 RX ADMIN — DOCUSATE SODIUM 50 MG AND SENNOSIDES 8.6 MG 2 TABLET: 8.6; 5 TABLET, FILM COATED ORAL at 09:14

## 2020-11-10 RX ADMIN — HYDROCODONE BITARTRATE AND ACETAMINOPHEN 1 TABLET: 5; 325 TABLET ORAL at 09:21

## 2020-11-10 ASSESSMENT — ACTIVITIES OF DAILY LIVING (ADL)
ADLS_ACUITY_SCORE: 18
ADLS_ACUITY_SCORE: 18
ADLS_ACUITY_SCORE: 20

## 2020-11-10 NOTE — DISCHARGE SUMMARY
[facility]    Discharge Summary  Hospitalist    Date of Admission:  11/4/2020  Date of Discharge:  11/10/2020  Discharging Provider: Pinky Colby DO  Date of Service (when I saw the patient): 11/10/20    Discharge Diagnoses   As below    History of Present Illness   65 year old female who was admitted on 11/4/2020 with bilateral pneumonia and respiratory failure, Covid 19 negative. She was treated with IV antibiotics and steroids and demonstrated improvement in her symptoms and will discharge back to her facility.     Impression:   Principal Problem:    Bilateral Pneumonia, Right and Left [place] Lobes  Severe Sepsis present on admission-resolved              -- will continue tx with antibiotics and steroid taper up to discharge       Acute Resp failure with hypoxia -resolving              --on 1-3L of O2 which is baseline, continue supplemental O2 and will finish tx with steroids and antibiotics tomorrow       Large chronic Sacral decub ulcer POA -- has declined definitive surgical therapy, stage IV              -- continue wound care  And turns, WOC consulted and following              --complains of pain however refuses most turns, PRN pain meds available no change for now              --will follow wound care assessment today to determine need for further work up, per son today she has been having weekly wound care visits at her nursing facility for her wound and it has been improving.     Active Problems:    COVID-19 negative x 2              -- transfer to medicine floor       History of stroke w residual Left hemiplegia       Chronic obstructive pulmonary disease               -no wheezing on exam              -continue care as above              -on home inhaler with PRN albuterol     Elevated BP              -no essential HTN in chart              -BP elevated last 24 hours likely steroid induced              -will add on PRN for now    Pinky Colby DO    Significant Results  and Procedures   As above    Pending Results   Unresulted Labs Ordered in the Past 30 Days of this Admission     No orders found from 10/5/2020 to 11/5/2020.          Code Status   DNR / DNI       Primary Care Physician   Staci Bustos    GENERAL:  Alert, in no distress, anxious   PULMONARY:  Clear to auscultation. No wheezing trace crackles in RLL  CARDIOVASCULAR:  S1, S2, regular rate and rhythm.   GASTROINTESTINAL:  Abdomen is obese.  Soft, nontender, normal bowel sounds.   MUSCULOSKELETAL:  No edema.   NEUROLOGIC:  Alert, left hemiplegia, unable to sit up in bed 2nd to weakness  SKIN:  large sacral ulcer (per nursing)    Discharge Disposition   Discharged to nursing home  Condition at discharge: Stable    Consultations This Hospital Stay   WOUND OSTOMY CONTINENCE NURSE  IP CONSULT  CARE MANAGEMENT / SOCIAL WORK IP CONSULT    Time Spent on this Encounter   IPinky DO, personally saw the patient today and spent greater than 30 minutes discharging this patient.    Discharge Orders      General info for SNF    Length of Stay Estimate: Long Term Care  Condition at Discharge: Stable  Level of care:board and care  Rehabilitation Potential: Fair  Admission H&P remains valid and up-to-date: Yes  Recent Chemotherapy: N/A  Use Nursing Home Standing Orders: yes     Mantoux instructions    Give two-step Mantoux (PPD) Per Facility Policy Yes     Reason for your hospital stay    You presented for pneumonia and were treated with IV antibiotics     Advance Diet as Tolerated    Follow this diet upon discharge: Orders Placed This Encounter      Combination Diet Regular Diet Adult     Discharge Medications   Discharge Medication List as of 11/10/2020 10:19 AM      CONTINUE these medications which have CHANGED    Details   !! HYDROcodone-acetaminophen (NORCO) 5-325 MG tablet Take 1 tablet by mouth 2 times daily as needed for severe pain, Disp-15 tablet, R-0, Local Print      !! HYDROcodone-acetaminophen (NORCO)  5-325 MG tablet Take 1 tablet by mouth 2 times daily, Disp-15 tablet, R-0, Local Print      lidocaine (XYLOCAINE) 4 % external solution Apply topically daily as needed for moderate painDisp-50 mL, R-0Local Print       !! - Potential duplicate medications found. Please discuss with provider.      CONTINUE these medications which have NOT CHANGED    Details   albuterol (PROAIR HFA/PROVENTIL HFA/VENTOLIN HFA) 108 (90 Base) MCG/ACT inhaler Inhale 2 puffs into the lungs every 4 hours as needed for shortness of breath / dyspnea or wheezing, HistoricalPharmacy may dispense brand covered by insurance (Proair, or proventil or ventolin or generic albuterol inhaler)      apixaban ANTICOAGULANT (ELIQUIS) 5 MG tablet Take 5 mg by mouth 2 times daily, Historical      atorvastatin (LIPITOR) 40 MG tablet Take 40 mg by mouth At Bedtime, Historical      bisacodyl (DULCOLAX) 10 MG suppository Place 10 mg rectally daily as needed for constipation, Historical      calcium carbonate (TUMS) 500 MG chewable tablet Take 1 chew tab by mouth 3 times daily as needed for heartburn, Historical      diclofenac (VOLTAREN) 1 % topical gel Apply 2 g topically 3 times daily To left hand, Historical      DULoxetine (CYMBALTA) 20 MG capsule Take 40 mg by mouth every evening, Historical      fluticasone-salmeterol (ADVAIR) 500-50 MCG/DOSE inhaler Inhale 1 puff into the lungs 2 times daily, Historical      furosemide (LASIX) 20 MG tablet Take 20 mg by mouth every morning, Historical      gabapentin (NEURONTIN) 300 MG capsule Take 900 mg by mouth At Bedtime, Historical      gabapentin (NEURONTIN) 600 MG tablet Take 600 mg by mouth 2 times daily, Historical      melatonin 3 MG tablet Take 1 mg by mouth At Bedtime, Historical      montelukast (SINGULAIR) 10 MG tablet Take 10 mg by mouth At Bedtime, Historical      multivitamin, therapeutic (THERA-VIT) TABS tablet Take 1 tablet by mouth daily, Historical      nystatin (NYSTOP) 489668 UNIT/GM external powder  Apply topically 2 times daily as needed for other (yeast)Historical      pantoprazole (PROTONIX) 40 MG EC tablet Take 40 mg by mouth daily, Historical      QUEtiapine (SEROQUEL) 25 MG tablet Take 25 mg by mouth 3 times daily, Historical      senna-docusate (SENOKOT-S/PERICOLACE) 8.6-50 MG tablet Take 2 tablets by mouth 2 times daily, Historical      sennosides (SENOKOT) 8.6 MG tablet Take 2 tablets by mouth 2 times daily as needed for constipation, Historical      tiZANidine (ZANAFLEX) 6 MG capsule Take 6 mg by mouth 3 times daily, Historical           Allergies   Allergies   Allergen Reactions     Msg [Monosodium Glutamate]      Data   Most Recent 3 CBC's:  Recent Labs   Lab Test 11/09/20  0743 11/07/20  1053 11/06/20  0909   WBC 14.4* 14.5* 11.5*   HGB 10.5* 9.8* 9.2*   MCV 91 92 94   * 454* 450      Most Recent 3 BMP's:  Recent Labs   Lab Test 11/09/20  0743 11/08/20  0708 11/07/20  1053    137 139   POTASSIUM 3.8 3.6 3.5   CHLORIDE 102 102 102   CO2 30 30 31   BUN 21 14 13   CR 0.41* 0.45* 0.54   ANIONGAP 4 5 6   JUVENCIO 8.9 9.4 8.8   GLC 94 100* 115*     Most Recent 2 LFT's:  Recent Labs   Lab Test 11/04/20  1336 11/04/20  0442   AST 17 16   ALT 35 37   ALKPHOS 117 116   BILITOTAL 0.2 0.3     Most Recent INR's and Anticoagulation Dosing History:  Anticoagulation Dose History     Recent Dosing and Labs Latest Ref Rng & Units 11/4/2020 11/5/2020    INR 0.86 - 1.14 1.20(H) 1.25(H)        Most Recent 3 Troponin's:  Recent Labs   Lab Test 11/05/20  0717 11/04/20  1336 11/04/20  0442   TROPI <0.015 <0.015 <0.015     Most Recent Cholesterol Panel:No lab results found.  Most Recent 6 Bacteria Isolates From Any Culture (See EPIC Reports for Culture Details):  Recent Labs   Lab Test 11/04/20  0526 11/04/20  0443   CULT No growth No growth     Most Recent TSH, T4 and A1c Labs:  Recent Labs   Lab Test 11/04/20  1336   TSH 0.15*   T4 1.49*

## 2020-11-10 NOTE — DISCHARGE INSTRUCTIONS
Wound care to coxxyx DAILY     Comments: Coccyx: Daily and prn:   1.  Cleanse with Vashe-moist gauze   2.  Swab periwound with Cavilon no-sting barrier, let dry   3.  Pack wound with 1-2 Vashe-moistened gauze fluffs   4.  Cover with a folded 4x4 dry gauze and a Mepilex 4x4   5.  PIP measures; Pulsate mattress        2.  Requires repo every 2 hours - however she prefers to lie on left side  3. Has left hemiplegia. Left hand contractured - wash/dry hand and place rolled up washcloth into hand for comfort  4. Ativity as tolerated - use lift as needed for transfers  5. Set up pt for meals, she can feed self with right hand.

## 2020-11-10 NOTE — PLAN OF CARE
Pt A/Ox4. VSS on 1L NC. C/o sacral pain, managed with PRN norco.  L hemiplegia from previous stroke. T/R q2h, refused often as patient reports is painful to lie on R side. Purewick in place with adequate output. Sacral wound intact per WOC orders. No IV access, MD aware. Plan to discharge to [facility] via stretcher @ 5644 today.

## 2020-11-10 NOTE — PLAN OF CARE
7a-3p nurse shift  Patient discharged at approx 1030 back to [facility] facility. IV was discontinued. Sacral/coccyx pressure wound pain at time of discharge was tolerable at a 5 on 1-10 scale after Norco given earlier. Purwick removed, clean brief applied. Belongings returned to patient.  Discharge instructions and medications reviewed with patient.  Patient verbalized understanding and all questions were answered. At time of discharge, patient condition was stable and left the unit on stretcher wrapped in blankets escorted by [facility].  Requires total cares except can feed self with right hand when set up. Still refuses repos other than lying on left side

## 2020-11-10 NOTE — PLAN OF CARE
AOx4. VSS on 2 L NC. Regular diet, tolerating well. Up A2 w/lift. T/R q 2 hrs, refusing to lay on left sides at times. No IV access. Purewick in place with yellow UOP. C/o 8/10 pain from sacral wound, PRN norco given 2x, effective. Pressure injury on sacrum, packed with gauze and mepilex in place, CDI. Plan on discharge to [facility] tomorrow at 1030.

## 2020-11-10 NOTE — PROGRESS NOTES
Care Management Discharge Note     Discharge Date: 11/10/20([facility])     Discharge Disposition: Long Term Care     Discharge Services: Transportation Services     Discharge DME: Oxygen     Discharge Transportation: [facility]     Private pay costs discussed: transportation costs Discussed with patient and son Mauri that we submit for medical reason why Stretcher is needed, but unable to guarantee insurance coverage.  Patient and son in understanding.      PAS Confirmation Code:  N/A  Patient/family educated on Medicare website which has current facility and service quality ratings:       Education Provided on the Discharge Plan:  Yes  Persons Notified of Discharge Plans: Patient and son   Patient/Family in Agreement with the Plan:  Yes     Handoff Referral Completed: No     Additional Information:  Received discharge orders for patient to discharge back to TCU.  Ride arranged with [facility] for a stretcher ride secondary to left sided hemiplegia, hx of stroke, and coccyx wound which patient is unable to tolerate sitting on for more than 10 minutes. Ride arranged for 10:30.  Updated patient, son and facility.  All in agreement with discharge plan.  Faxed orders to facility.  PCS form completed, faxed to  and provided to [facility]     MARY Turner, LGSW  [phone]  [facility]

## 2020-11-12 ENCOUNTER — AMBULATORY - HEALTHEAST (OUTPATIENT)
Dept: OTHER | Facility: CLINIC | Age: 65
End: 2020-11-12

## 2021-07-13 ENCOUNTER — RECORDS - HEALTHEAST (OUTPATIENT)
Dept: ADMINISTRATIVE | Facility: CLINIC | Age: 66
End: 2021-07-13